# Patient Record
Sex: MALE | Race: WHITE | NOT HISPANIC OR LATINO | Employment: FULL TIME | URBAN - METROPOLITAN AREA
[De-identification: names, ages, dates, MRNs, and addresses within clinical notes are randomized per-mention and may not be internally consistent; named-entity substitution may affect disease eponyms.]

---

## 2017-01-31 DIAGNOSIS — G47.33 OBSTRUCTIVE SLEEP APNEA: Primary | ICD-10-CM

## 2017-07-17 ENCOUNTER — RADIANT APPOINTMENT (OUTPATIENT)
Dept: GENERAL RADIOLOGY | Facility: CLINIC | Age: 42
End: 2017-07-17
Attending: FAMILY MEDICINE
Payer: COMMERCIAL

## 2017-07-17 ENCOUNTER — OFFICE VISIT (OUTPATIENT)
Dept: FAMILY MEDICINE | Facility: CLINIC | Age: 42
End: 2017-07-17
Payer: OTHER MISCELLANEOUS

## 2017-07-17 VITALS
BODY MASS INDEX: 44.1 KG/M2 | TEMPERATURE: 99.3 F | WEIGHT: 315 LBS | DIASTOLIC BLOOD PRESSURE: 88 MMHG | HEART RATE: 74 BPM | OXYGEN SATURATION: 95 % | SYSTOLIC BLOOD PRESSURE: 124 MMHG | HEIGHT: 71 IN

## 2017-07-17 DIAGNOSIS — M79.672 PAIN OF LEFT HEEL: ICD-10-CM

## 2017-07-17 DIAGNOSIS — M79.672 PAIN OF LEFT HEEL: Primary | ICD-10-CM

## 2017-07-17 PROCEDURE — 73650 X-RAY EXAM OF HEEL: CPT | Mod: LT

## 2017-07-17 PROCEDURE — 99213 OFFICE O/P EST LOW 20 MIN: CPT | Performed by: FAMILY MEDICINE

## 2017-07-17 NOTE — MR AVS SNAPSHOT
"              After Visit Summary   7/17/2017    Rm Ordoñez    MRN: [id number]           Patient Information     Date Of Birth          1975        Visit Information        Provider Department      7/17/2017 2:40 PM Hayden Orellana MD [facility]        Today's Diagnoses     Pain of left heel    -  1       Follow-ups after your visit        Who to contact     If you have questions or need follow up information about today's clinic visit or your schedule please contact [facility] directly at [phone].  Normal or non-critical lab and imaging results will be communicated to you by Plinkhart, letter or phone within 4 business days after the clinic has received the results. If you do not hear from us within 7 days, please contact the clinic through Euro Card Spaint or phone. If you have a critical or abnormal lab result, we will notify you by phone as soon as possible.  Submit refill requests through Maozhao or call your pharmacy and they will forward the refill request to us. Please allow 3 business days for your refill to be completed.          Additional Information About Your Visit        MyChart Information     Maozhao gives you secure access to your electronic health record. If you see a primary care provider, you can also send messages to your care team and make appointments. If you have questions, please call your primary care clinic.  If you do not have a primary care provider, please call [phone] and they will assist you.        Care EveryWhere ID     This is your Care EveryWhere ID. This could be used by other organizations to access your [place] medical records  [id number]        Your Vitals Were     Pulse Temperature Height Pulse Oximetry BMI (Body Mass Index)       74 99.3  F (37.4  C) (Oral) 5' 11\" (1.803 m) 95% 44.04 kg/m2        Blood Pressure from Last 3 Encounters:   07/17/17 124/88   01/08/16 (!) 142/92   10/29/15 (!) 138/96    Weight from Last 3 " Encounters:   07/17/17 (!) 315 lb 12 oz (143.2 kg)   01/08/16 298 lb (135.2 kg)   10/29/15 295 lb (133.8 kg)               Primary Care Provider Office Phone # Fax #    Hayden Nolvia Orellana [phone]       [place] [address] [place] [id number]        Equal Access to Services     TIRSO MICHELLE : Hadii aad ku hadasho Soomaali, waaxda luqadaha, qaybta kaalmada adeegyada, waxay idiin hayaan adeeg khjanellesh lazara navarro. So [facility] [phone].    ATENCIÓN: Si habla español, tiene a todd disposición servicios gratuitos de asistencia lingüística. Llame al [phone].    We comply with applicable federal civil rights laws and [place] laws. We do not discriminate on the basis of race, color, national origin, age, disability sex, sexual orientation or gender identity.            Thank you!     Thank you for choosing [facility]  for your care. Our goal is always to provide you with excellent care. Hearing back from our patients is one way we can continue to improve our services. Please take a few minutes to complete the written survey that you may receive in the mail after your visit with us. Thank you!             Your Updated Medication List - Protect others around you: Learn how to safely use, store and throw away your medicines at www.disposemymeds.org.          This list is accurate as of: 7/17/17 11:59 PM.  Always use your most recent med list.                   Brand Name Dispense Instructions for use Diagnosis    order for DME      Equipment being ordered: CPAP Patient Rm Ordoñez was set up at [place] on November 12, 2015. Patient received a Resmed AirCurve 10 Bilevel. Pressures were set at [id number]-10.   Patient?s ramp is 7 cm H2O for Auto and FLEX/EPR is EPR.  Patient received a Mendez & Paykel Mask name: SIMPLUS  Full Face mask Size Medium, heated tubing and heated humidifier.  Patient is enrolled in the [facility] Program and does need to meet  compliance. Patient has a follow up on TBD with Dr. Mcclendon.  Cecile Fernando

## 2017-07-17 NOTE — NURSING NOTE
"Chief Complaint   Patient presents with     Work Comp     Musculoskeletal Problem       Initial /88 (Cuff Size: Adult Large)  Pulse 74  Temp 99.3  F (37.4  C) (Oral)  Ht 5' 11\" (1.803 m)  Wt (!) 315 lb 12 oz (143.2 kg)  SpO2 95%  BMI 44.04 kg/m2 Estimated body mass index is 44.04 kg/(m^2) as calculated from the following:    Height as of this encounter: 5' 11\" (1.803 m).    Weight as of this encounter: 315 lb 12 oz (143.2 kg).  Medication Reconciliation: complete     Eli Roque, LORELEI      "

## 2017-10-17 ENCOUNTER — OFFICE VISIT (OUTPATIENT)
Dept: FAMILY MEDICINE | Facility: CLINIC | Age: 42
End: 2017-10-17
Payer: COMMERCIAL

## 2017-10-17 VITALS
TEMPERATURE: 98.4 F | WEIGHT: 315 LBS | OXYGEN SATURATION: 95 % | RESPIRATION RATE: 22 BRPM | BODY MASS INDEX: 44.91 KG/M2 | HEART RATE: 68 BPM | SYSTOLIC BLOOD PRESSURE: 134 MMHG | DIASTOLIC BLOOD PRESSURE: 84 MMHG

## 2017-10-17 DIAGNOSIS — R35.0 URINARY FREQUENCY: Primary | ICD-10-CM

## 2017-10-17 LAB
ALBUMIN UR-MCNC: NEGATIVE MG/DL
APPEARANCE UR: CLEAR
BASOPHILS # BLD AUTO: 0 10E9/L (ref 0–0.2)
BASOPHILS NFR BLD AUTO: 0.2 %
BILIRUB UR QL STRIP: NEGATIVE
COLOR UR AUTO: YELLOW
DIFFERENTIAL METHOD BLD: ABNORMAL
EOSINOPHIL # BLD AUTO: 0.2 10E9/L (ref 0–0.7)
EOSINOPHIL NFR BLD AUTO: 2 %
ERYTHROCYTE [DISTWIDTH] IN BLOOD BY AUTOMATED COUNT: 13.9 % (ref 10–15)
GLUCOSE UR STRIP-MCNC: NEGATIVE MG/DL
HCT VFR BLD AUTO: 46.7 % (ref 40–53)
HGB BLD-MCNC: 15.5 G/DL (ref 13.3–17.7)
HGB UR QL STRIP: NEGATIVE
KETONES UR STRIP-MCNC: NEGATIVE MG/DL
LEUKOCYTE ESTERASE UR QL STRIP: NEGATIVE
LYMPHOCYTES # BLD AUTO: 2.1 10E9/L (ref 0.8–5.3)
LYMPHOCYTES NFR BLD AUTO: 18.5 %
MCH RBC QN AUTO: 29.6 PG (ref 26.5–33)
MCHC RBC AUTO-ENTMCNC: 33.2 G/DL (ref 31.5–36.5)
MCV RBC AUTO: 89 FL (ref 78–100)
MONOCYTES # BLD AUTO: 1 10E9/L (ref 0–1.3)
MONOCYTES NFR BLD AUTO: 9.1 %
NEUTROPHILS # BLD AUTO: 7.9 10E9/L (ref 1.6–8.3)
NEUTROPHILS NFR BLD AUTO: 70.2 %
NITRATE UR QL: NEGATIVE
PH UR STRIP: 7.5 PH (ref 5–7)
PLATELET # BLD AUTO: 323 10E9/L (ref 150–450)
PSA SERPL-ACNC: 0.4 UG/L (ref 0–4)
RBC # BLD AUTO: 5.24 10E12/L (ref 4.4–5.9)
SOURCE: ABNORMAL
SP GR UR STRIP: 1.01 (ref 1–1.03)
UROBILINOGEN UR STRIP-ACNC: 0.2 EU/DL (ref 0.2–1)
WBC # BLD AUTO: 11.2 10E9/L (ref 4–11)

## 2017-10-17 PROCEDURE — 81003 URINALYSIS AUTO W/O SCOPE: CPT | Performed by: PHYSICIAN ASSISTANT

## 2017-10-17 PROCEDURE — 36415 COLL VENOUS BLD VENIPUNCTURE: CPT | Performed by: PHYSICIAN ASSISTANT

## 2017-10-17 PROCEDURE — 85025 COMPLETE CBC W/AUTO DIFF WBC: CPT | Performed by: PHYSICIAN ASSISTANT

## 2017-10-17 PROCEDURE — 99213 OFFICE O/P EST LOW 20 MIN: CPT | Performed by: PHYSICIAN ASSISTANT

## 2017-10-17 PROCEDURE — G0103 PSA SCREENING: HCPCS | Performed by: PHYSICIAN ASSISTANT

## 2017-10-17 NOTE — NURSING NOTE
Vitals:    10/17/17 0920 10/17/17 0952   BP: 141/90 134/84   BP Location: Left arm Left arm   Patient Position: Sitting Sitting   Cuff Size: Adult Large Adult Large   Pulse: 73 68   Resp: 22    Temp: 98.4  F (36.9  C)    TempSrc: Oral    SpO2: 95%    Weight: (!) 322 lb (146.1 kg)          Román Peterson CMA

## 2017-10-17 NOTE — PATIENT INSTRUCTIONS
Referral for urology updated.  PSA and Complete Blood Count done.  Return to clinic with any worsening or changes in symptoms and follow up with PCP for routine care.

## 2017-10-17 NOTE — MR AVS SNAPSHOT
After Visit Summary   10/17/2017    Rm Ordoñez    MRN: [id number]           Patient Information     Date Of Birth          1975        Visit Information        Provider Department      10/17/2017 9:20 AM Magalie Camacho PA-C [facility]        Today's Diagnoses     Urinary frequency    -  1       Follow-ups after your visit        Additional Services     UROLOGY ADULT REFERRAL       Your provider has referred you to: FMG: [facility] [phone]   [email or web address]  FHN: [facility] [place] [phone]   [email or web address]  [place] [phone]   [email or web address]    Please be aware that coverage of these services is subject to the terms and limitations of your health insurance plan.  Call member services at your health plan with any benefit or coverage questions.      Please bring the following with you to your appointment:    (1) Any X-Rays, CTs or MRIs which have been performed.  Contact the facility where they were done to arrange for  prior to your scheduled appointment.    (2) List of current medications  (3) This referral request   (4) Any documents/labs given to you for this referral                  Who to contact     If you have questions or need follow up information about today's clinic visit or your schedule please contact [facility] directly at [phone].  Normal or non-critical lab and imaging results will be communicated to you by MyChart, letter or phone within 4 business days after the clinic has received the results. If you do not hear from us within 7 days, please contact the clinic through MyChart or phone. If you have a critical or abnormal lab result, we will notify you by phone as soon as possible.  Submit refill requests through Berry Kitchen or call your pharmacy and they will forward the refill request to us. Please allow 3 business days for your refill  to be completed.          Additional Information About Your Visit        Kinesio Capturehart Information     [employer] gives you secure access to your electronic health record. If you see a primary care provider, you can also send messages to your care team and make appointments. If you have questions, please call your primary care clinic.  If you do not have a primary care provider, please call [phone] and they will assist you.        Care EveryWhere ID     This is your Care EveryWhere ID. This could be used by other organizations to access your [place] medical records  [id number]        Your Vitals Were     Pulse Temperature Respirations Pulse Oximetry BMI (Body Mass Index)       73 98.4  F (36.9  C) (Oral) 22 95% 44.91 kg/m2        Blood Pressure from Last 3 Encounters:   10/17/17 141/90   07/17/17 124/88   01/08/16 (!) 142/92    Weight from Last 3 Encounters:   10/17/17 (!) 322 lb (146.1 kg)   07/17/17 (!) 315 lb 12 oz (143.2 kg)   01/08/16 298 lb (135.2 kg)              We Performed the Following     CBC with platelets differential     Prostate spec antigen screen     UA reflex to Microscopic and Culture     UROLOGY ADULT REFERRAL        Primary Care Provider Office Phone # Fax #    Hayden Nolvia Orellana [phone] [address] [place] [address]        Equal Access to Services     TIRSO MICHELLE : Hadii lin ku hadasho Soomaali, waaxda luqadaha, qaybta kaalmada shirin, yonathan navarro. So [facility] [phone].    ATENCIÓN: Si habla español, tiene a todd disposición servicios gratuitos de asistencia lingüística. Llmarco al [phone].    We comply with applicable federal civil rights laws and [place] laws. We do not discriminate on the basis of race, color, national origin, age, disability, sex, sexual orientation, or gender identity.            Thank you!     Thank you for choosing [facility]  for your care. Our goal is always to provide you  with excellent care. Hearing back from our patients is one way we can continue to improve our services. Please take a few minutes to complete the written survey that you may receive in the mail after your visit with us. Thank you!             Your Updated Medication List - Protect others around you: Learn how to safely use, store and throw away your medicines at www.disposemymeds.org.          This list is accurate as of: 10/17/17  9:36 AM.  Always use your most recent med list.                   Brand Name Dispense Instructions for use Diagnosis    order for DME      Equipment being ordered: CPAP Patient Rm Ordoñez was set up at [place] on November 12, 2015. Patient received a Resmed AirCurve 10 Bilevel. Pressures were set at [id number]-10.   Patient?s ramp is 7 cm H2O for Auto and FLEX/EPR is EPR.  Patient received a Mendez & PaySwarm Mask name: SIMPLUS  Full Face mask Size Medium, heated tubing and heated humidifier.  Patient is enrolled in the STM Program and does need to meet compliance. Patient has a follow up on TBD with Dr. Mcclendon.  Cecile Fernando

## 2017-10-17 NOTE — NURSING NOTE
"Chief Complaint   Patient presents with     Urinary Problem       Initial /90 (BP Location: Left arm, Patient Position: Sitting, Cuff Size: Adult Large)  Pulse 73  Temp 98.4  F (36.9  C) (Oral)  Resp 22  Wt (!) 322 lb (146.1 kg)  SpO2 95%  BMI 44.91 kg/m2 Estimated body mass index is 44.91 kg/(m^2) as calculated from the following:    Height as of 7/17/17: 5' 11\" (1.803 m).    Weight as of this encounter: 322 lb (146.1 kg).  Medication Reconciliation: complete       Román Peterson CMA  "

## 2017-10-17 NOTE — PROGRESS NOTES
SUBJECTIVE:   Rm Ordoñez is a 42 year old male who presents to clinic today for the following health issues:    Genitourinary symptoms      Duration: on going since 7 years old     Description:  Dysuria, Urgency, and frequency    Intensity:  mild    Accompanying signs and symptoms (fever/discharge/nausea/vomiting/back or abdominal pain):  None    History (frequent UTI's/kidney stones/prostate problems): UTI  Sexually active: no     Precipitating or alleviating factors: None    Therapies tried and outcome: cranberry juice and increase fluid intake   Outcome: does not help       History of workup at [facility].  Previously referred to urology as well., but never went.  Symptoms every half hour more recently over the past few weeks with a little discomfort.  History of kidney stones in family.  No fever, chills, night sweats.  NO std concerns.        Problem list and histories reviewed & adjusted, as indicated.  Additional history: as documented    Patient Active Problem List   Diagnosis     CARDIOVASCULAR SCREENING; LDL GOAL LESS THAN 160     Obstructive sleep apnea     Obesity (BMI 30-39.9)     Past Surgical History:   Procedure Laterality Date     ENT SURGERY      tonsilectomy, septum repair, sleep apnea       Social History   Substance Use Topics     Smoking status: Never Smoker     Smokeless tobacco: Never Used     Alcohol use No     History reviewed. No pertinent family history.      Current Outpatient Prescriptions   Medication Sig Dispense Refill     order for DME Equipment being ordered: CPAP  Patient Rm Ordoñez was set up at [place] on November 12, 2015. Patient received a Resmed AirCurve 10 Bilevel. Pressures were set at [id number]-10.   Patient s ramp is 7 cm H2O for Auto and FLEX/EPR is EPR.  Patient received a Mendez & Additech Mask name: SIMPLUS  Full Face mask Size Medium, heated tubing and heated humidifier.  Patient is enrolled in the STM Program and does need to meet  compliance. Patient has a follow up on TBD with Dr. Mcclendon.   Cecile Fernando       No Known Allergies      Reviewed and updated as needed this visit by clinical staffTobacco  Allergies  Meds  Problems  Med Hx  Surg Hx  Fam Hx  Soc Hx        Reviewed and updated as needed this visit by Provider  Allergies  Meds  Problems         ROS:  Constitutional, HEENT, cardiovascular, pulmonary, gi and gu systems are negative, except as otherwise noted.      OBJECTIVE:   /90 (BP Location: Left arm, Patient Position: Sitting, Cuff Size: Adult Large)  Pulse 73  Temp 98.4  F (36.9  C) (Oral)  Resp 22  Wt (!) 322 lb (146.1 kg)  SpO2 95%  BMI 44.91 kg/m2  Body mass index is 44.91 kg/(m^2).  GENERAL: healthy, alert and no distress  RESP: lungs clear to auscultation - no rales, rhonchi or wheezes  CV: regular rate and rhythm, normal S1 S2, no S3 or S4, no murmur, click or rub, no peripheral edema and peripheral pulses strong  ABDOMEN: soft, nontender, no hepatosplenomegaly, no masses and bowel sounds normal  BACK: no CVA tenderness  PSYCH: mentation appears normal, affect normal/bright    Diagnostic Test Results:  Results for orders placed or performed in visit on 10/17/17   UA reflex to Microscopic and Culture   Result Value Ref Range    Color Urine Yellow     Appearance Urine Clear     Glucose Urine Negative NEG^Negative mg/dL    Bilirubin Urine Negative NEG^Negative    Ketones Urine Negative NEG^Negative mg/dL    Specific Gravity Urine 1.015 1.003 - 1.035    Blood Urine Negative NEG^Negative    pH Urine 7.5 (H) 5.0 - 7.0 pH    Protein Albumin Urine Negative NEG^Negative mg/dL    Urobilinogen Urine 0.2 0.2 - 1.0 EU/dL    Nitrite Urine Negative NEG^Negative    Leukocyte Esterase Urine Negative NEG^Negative    Source Midstream Urine         ASSESSMENT/PLAN:       ICD-10-CM    1. Urinary frequency R35.0 UA reflex to Microscopic and Culture     Prostate spec antigen screen     CBC with platelets  differential     UROLOGY ADULT REFERRAL       Patient Instructions   Referral for urology updated.  PSA and Complete Blood Count done.  Return to clinic with any worsening or changes in symptoms and follow up with PCP for routine care.       Magalie Camacho PA-C  [facility]

## 2017-10-19 NOTE — PROGRESS NOTES
"Maggie Abdalla  Your attached labs are overall within normal limits. Please follow up with urology as previously discussed.  Please contact the office with any questions or concerns.    Magalie Ponce \"Reilly\" MIRIAM Camacho  "

## 2018-04-13 DIAGNOSIS — G47.33 OBSTRUCTIVE SLEEP APNEA: Primary | ICD-10-CM

## 2018-04-13 NOTE — PROGRESS NOTES
Orders sent to [facility] for cpap supplies    Rachel Adrian [facility] ~Mora

## 2018-06-06 ENCOUNTER — OFFICE VISIT (OUTPATIENT)
Dept: FAMILY MEDICINE | Facility: CLINIC | Age: 43
End: 2018-06-06
Payer: COMMERCIAL

## 2018-06-06 DIAGNOSIS — M54.6 ACUTE MIDLINE THORACIC BACK PAIN: Primary | ICD-10-CM

## 2018-06-06 PROCEDURE — 99213 OFFICE O/P EST LOW 20 MIN: CPT | Performed by: NURSE PRACTITIONER

## 2018-06-06 NOTE — MR AVS SNAPSHOT
After Visit Summary   6/6/2018    Rm Ordoñez    MRN: [id number]           Patient Information     Date Of Birth          1975        Visit Information        Provider Department      6/6/2018 10:00 AM Stefany Freed APRN [facility]        Today's Diagnoses     Acute midline thoracic back pain    -  1      Care Instructions    1.  For acute back pain suspect muscle strain, expect improvement in the next week.  Start ibuprofen 3-4 tabs three times a day.  Heat or ice.  Let me know if you want a muscle relaxer.  See physical therapy if not improving in the next week or now.  let me know if not improving within 1-2 weeks of physical therapy.  Weight loss will help prevent future episodes.            Follow-ups after your visit        Additional Services     [facility] PT, HAND, AND CHIROPRACTIC REFERRAL       **This order will print in the [facility] Scheduling Office**    Physical Therapy, Hand Therapy and Chiropractic Care are available through:    *[facility]  *[facility]  *[facility]    Call one number to schedule at any of the above locations: [phone].    Your provider has referred you to: [facility] - PT and/or Chiropractic Care determined by clinical presentation at [facility] or [facility] Initial Visit    Indication/Reason for Referral: Thoracic Pain  Onset of Illness: 1 week  Therapy Orders: Evaluate and Treat  Special Programs: None  Special Request: None    Elizabeth Johnson      Additional Comments for the Therapist or Chiropractor:     Please be aware that coverage of these services is subject to the terms and limitations of your health insurance plan.  Call member services at your health plan with any benefit or coverage questions.      Please bring the following to your appointment:    *Your personal calendar for scheduling future appointments  *Comfortable clothing                  Who to contact     If  you have questions or need follow up information about today's clinic visit or your schedule please [facility] directly at [phone].  Normal or non-critical lab and imaging results will be communicated to you by MyChart, letter or phone within 4 business days after the clinic has received the results. If you do not hear from us within 7 days, please contact the clinic through Innovative Student Loan Solutionshart or phone. If you have a critical or abnormal lab result, we will notify you by phone as soon as possible.  Submit refill requests through Konbini or call your pharmacy and they will forward the refill request to us. Please allow 3 business days for your refill to be completed.          Additional Information About Your Visit        In[employer] Solutions[employer]SnagFilms Information     Konbini gives you secure access to your electronic health record. If you see a primary care provider, you can also send messages to your care team and make appointments. If you have questions, please call your primary care clinic.  If you do not have a primary care provider, please call [phone] and they will assist you.        Care EveryWhere ID     This is your Care EveryWhere ID. This could be used by other organizations to access your [place] medical records  [id number]         Blood Pressure from Last 3 Encounters:   10/17/17 134/84   07/17/17 124/88   01/08/16 (!) 142/92    Weight from Last 3 Encounters:   10/17/17 (!) 322 lb (146.1 kg)   07/17/17 (!) 315 lb 12 oz (143.2 kg)   01/08/16 298 lb (135.2 kg)              We Performed the Following     [facility] PT, HAND, AND CHIROPRACTIC REFERRAL        Primary Care Provider Office Phone # Fax #    Haydenjessica Orellana [phone] [address] [place] [address]        Equal Access to Services     TIRSO MICHELLE : Andry Baird, nellie peacock, yonathan burris. So [facility] [phone].    ATENCIÓN: Rachell lee  español, tiene a todd disposición servicios gratuitos de asistencia lingüística. Sam wing [phone].    We comply with applicable federal civil rights laws and [place] laws. We do not discriminate on the basis of race, color, national origin, age, disability, sex, sexual orientation, or gender identity.            Thank you!     Thank you for choosing [facility]  for your care. Our goal is always to provide you with excellent care. Hearing back from our patients is one way we can continue to improve our services. Please take a few minutes to complete the written survey that you may receive in the mail after your visit with us. Thank you!             Your Updated Medication List - Protect others around you: Learn how to safely use, store and throw away your medicines at www.disposemymeds.org.          This list is accurate as of 6/6/18 10:19 AM.  Always use your most recent med list.                   Brand Name Dispense Instructions for use Diagnosis    order for DME      Equipment being ordered: CPAP Patient Rm Ordoñez was set up at [place] on November 12, 2015. Patient received a Resmed AirCurve 10 Bilevel. Pressures were set at [id number]-10.   Patient?s ramp is 7 cm H2O for Auto and FLEX/EPR is EPR.  Patient received a Mendez & Paykel Mask name: SIMPLUS  Full Face mask Size Medium, heated tubing and heated humidifier.  Patient is enrolled in the STM Program and does need to meet compliance. Patient has a follow up on TBD with Dr. Mcclendon.  Cecile Fernando

## 2018-06-06 NOTE — PATIENT INSTRUCTIONS
1.  For acute back pain suspect muscle strain, expect improvement in the next week.  Start ibuprofen 3-4 tabs three times a day.  Heat or ice.  Let me know if you want a muscle relaxer.  See physical therapy if not improving in the next week or now.  let me know if not improving within 1-2 weeks of physical therapy.  Weight loss will help prevent future episodes.

## 2018-06-06 NOTE — PROGRESS NOTES
SUBJECTIVE:   Rm Ordoñez is a 42 year old male who presents to clinic today for the following health issues:      Sudden onset of back pain 1 week ago, woke up with significant pain.  Pain is to midline thoracic back.  Pain is constant.  Does improve with ibuprofen.  Pain is worse with movement.  Hard to stand up after leaning over kitchen table yesterday.  No fevers.  No injury or overuse.  No hx of chronic back problems.  No dysuria, urinary frequency, or hematuria.  No coughing or shortness of breath.  Pain does not radiate down     Family hx of kidney stones (mom).  No personal history.    Patient Active Problem List   Diagnosis     CARDIOVASCULAR SCREENING; LDL GOAL LESS THAN 160     Obstructive sleep apnea     Obesity (BMI 30-39.9)     Past Surgical History:   Procedure Laterality Date     ENT SURGERY      tonsilectomy, septum repair, sleep apnea       Social History   Substance Use Topics     Smoking status: Never Smoker     Smokeless tobacco: Never Used     Alcohol use No     No family history on file.      Current Outpatient Prescriptions   Medication Sig Dispense Refill     order for DME Equipment being ordered: CPAP  Patient Rm Ordoñez was set up at [facility] on November 12, 2015. Patient received a Resmed AirCurve 10 Bilevel. Pressures were set at [id number].   Patient s ramp is 7 cm H2O for Auto and FLEX/EPR is EPR.  Patient received a [employer] Mask name: SIMPLUS  Full Face mask Size Medium, heated tubing and heated humidifier.  Patient is enrolled in the [facility] Program and does need to meet compliance. Patient has a follow up on TBD with Dr. Mcclendon.   Cecile Fernando         ROS:  Resp, MSK, Neuro,  as above, otherwise negative       OBJECTIVE:                                                    There were no vitals taken for this visit.   GENERAL APPEARANCE: healthy, alert and no distress  EYES: Eyes grossly normal to inspection and conjunctivae and sclerae  normal  RESP: respirations nonlabored   ORTHO: Lumber/Thoracic Spine Exam: Tender:  thoracic spinous processes  Non-tender:  left parathoracic muscles, right parathoracic muscles, lumbar spinous processes  Range of Motion:  Full active ROM, pain with lateral bend bilaterally and rotation.  Strength:  able to heel walk, able to toe walk  Neuro:  Bilateral lower extremities strength +5/5    PSYCH: mentation appears normal and affect normal/bright        ASSESSMENT/PLAN:                                                    (M54.6) Acute midline thoracic back pain  (primary encounter diagnosis)  Comment: suspect acute strain.  Given constant symptoms worse with movement MSK etiology seems likely.  No urinary symptoms and presentation not consistent with renal colic.    Plan: [facility], AND CHIROPRACTIC REFERRAL         Discussed good prognosis with conservative management.  NSAIDS, gentle exercise, heat/ice.  Recommended not driving or operating machinery on flexeril.  Discussed avoiding bed rest.  If no improvement in 1 week, pt will see PT.  If no improvement in 4-6 weeks will refer to specialist.          See Patient Instructions    Stefany Freed, CNP  [facility]    Patient Instructions   1.  For acute back pain suspect muscle strain, expect improvement in the next week.  Start ibuprofen 3-4 tabs three times a day.  Heat or ice.  Let me know if you want a muscle relaxer.  See physical therapy if not improving in the next week or now.  let me know if not improving within 1-2 weeks of physical therapy.  Weight loss will help prevent future episodes.

## 2018-06-07 ENCOUNTER — THERAPY VISIT (OUTPATIENT)
Dept: PHYSICAL THERAPY | Facility: CLINIC | Age: 43
End: 2018-06-07
Attending: NURSE PRACTITIONER
Payer: COMMERCIAL

## 2018-06-07 DIAGNOSIS — M54.6 PAIN IN THORACIC SPINE: Primary | ICD-10-CM

## 2018-06-07 PROCEDURE — 97161 PT EVAL LOW COMPLEX 20 MIN: CPT | Mod: GP | Performed by: PHYSICAL THERAPIST

## 2018-06-07 PROCEDURE — 97110 THERAPEUTIC EXERCISES: CPT | Mod: GP | Performed by: PHYSICAL THERAPIST

## 2018-06-07 NOTE — PROGRESS NOTES
[facility] Initial Evaluation  Subjective:  Patient is a 42 year old male presenting with rehab back hpi. The history is provided by the patient. No  was used.   Rm Ordoñez is a 42 year old male with a thoracic condition.  Condition occurred with:  Insidious onset.  Condition occurred: in the community.  This is a new condition  Onset of mid back pain about 1 week ago for no known reason. .    Patient reports pain:  Central lumbar spine and central thoracic spine.    Pain is described as aching and sharp and is constant and reported as 2/10 and 9/10.   Pain is the same all the time.  Exacerbated by: nothing seems to change the pain. and relieved by NSAID's.  Since onset symptoms are gradually improving.        General health as reported by patient is good.  Pertinent medical history includes:  Overweight and sleep disorder/apnea.  Medical allergies: no.  Other surgeries include:  No.  Medication history: IBP.  Current occupation is .  Patient is currently not working due to present treatment problem.  Primary job tasks include:  Repetitive tasks.    Barriers include:  None as reported by the patient.    Red flags:  None as reported by the patient.                        Objective:  System         Lumbar/SI Evaluation  ROM:    AROM Lumbar:   Flexion:          To lower shin - pain  Ext:                    WNL - pain   Side Bend:        Left:  To upper knee- pain    Right:  To upper knee + pain in thoracic region  Rotation:           Left:     Right:   Side Glide:        Left:     Right:       AROM Thoracic:  Flex:               WNL -pain  Ext:                WNL - pain  Rotation:        Left:  WNL - pain    Right:  WNL + pain     Strength: Normal UE strength without pain            Lumbar Palpation:    Tenderness present at Left:    Erector Spinae  Tenderness not present at Left:    Vertebral  Tenderness present at Right: Erector Spinae  Tenderness not  present at Right:  Vertebral      Spinal Segmental Conclusions: Poor mid thoracic mobility noted                                                           General Evaluation:                        Posture:    Standing:   Rounded shoulders, forward head and thoracic kyphosis increased  Sitting:  Thoracic kyphosis increased, forward head and rounded shoulders                                           ROS    Assessment/Plan:    Patient is a 42 year old male with thoracic complaints.    Patient has the following significant findings with corresponding treatment plan.                Diagnosis 1:  Mid back pain likely due to poor posture and mid back weakness  Pain -  hot/cold therapy, manual therapy, self management, education and home program  Decreased ROM/flexibility - manual therapy, therapeutic exercise, therapeutic activity and home program  Decreased strength - therapeutic exercise, therapeutic activities and home program  Impaired muscle performance - neuro re-education and home program  Decreased function - therapeutic activities and home program  Impaired posture - neuro re-education, therapeutic activities and home program    Therapy Evaluation Codes:   1) History comprised of:   Personal factors that impact the plan of care:      None.    Comorbidity factors that impact the plan of care are:      Overweight and Sleep disorder/apnea.     Medications impacting care: NSAIDS.  2) Examination of Body Systems comprised of:   Body structures and functions that impact the plan of care:      Thoracic Spine.   Activity limitations that impact the plan of care are:      Sitting and Urgency.  3) Clinical presentation characteristics are:   Stable/Uncomplicated.  4) Decision-Making    Low complexity using standardized patient assessment instrument and/or measureable assessment of functional outcome.  Cumulative Therapy Evaluation is: Low complexity.    Previous and current functional limitations:  (See Goal Flow Sheet  for this information)    Short term and Long term goals: (See Goal Flow Sheet for this information)     Communication ability:  Patient appears to be able to clearly communicate and understand verbal and written communication and follow directions correctly.  Treatment Explanation - The following has been discussed with the patient:   RX ordered/plan of care  Anticipated outcomes  Possible risks and side effects  This patient would benefit from PT intervention to resume normal activities.   Rehab potential is good.    Frequency:  1 X week, once daily  Duration:  for 3-4 weeks  Discharge Plan:  Achieve all LTG.  Independent in home treatment program.  Reach maximal therapeutic benefit.    Please refer to the daily flowsheet for treatment today, total treatment time and time spent performing 1:1 timed codes.

## 2018-06-10 ENCOUNTER — MYC MEDICAL ADVICE (OUTPATIENT)
Dept: FAMILY MEDICINE | Facility: CLINIC | Age: 43
End: 2018-06-10

## 2018-06-11 NOTE — TELEPHONE ENCOUNTER
Form completed and placed in MA basket.  Please fax as indicated and abstract a copy into epic.    Stefany Freed CNP

## 2019-05-28 PROBLEM — M54.6 PAIN IN THORACIC SPINE: Status: RESOLVED | Noted: 2018-06-07 | Resolved: 2019-05-28

## 2019-11-20 DIAGNOSIS — G47.33 OBSTRUCTIVE SLEEP APNEA (ADULT) (PEDIATRIC): Primary | ICD-10-CM

## 2020-03-01 ENCOUNTER — HEALTH MAINTENANCE LETTER (OUTPATIENT)
Age: 45
End: 2020-03-01

## 2020-09-23 ENCOUNTER — OFFICE VISIT (OUTPATIENT)
Dept: FAMILY MEDICINE | Facility: CLINIC | Age: 45
End: 2020-09-23
Payer: COMMERCIAL

## 2020-09-23 VITALS
OXYGEN SATURATION: 97 % | SYSTOLIC BLOOD PRESSURE: 128 MMHG | WEIGHT: 315 LBS | HEART RATE: 66 BPM | DIASTOLIC BLOOD PRESSURE: 86 MMHG | HEIGHT: 71 IN | TEMPERATURE: 98.7 F | BODY MASS INDEX: 44.1 KG/M2 | RESPIRATION RATE: 20 BRPM

## 2020-09-23 DIAGNOSIS — Z13.1 SCREENING FOR DIABETES MELLITUS: ICD-10-CM

## 2020-09-23 DIAGNOSIS — E66.01 MORBID OBESITY (H): ICD-10-CM

## 2020-09-23 DIAGNOSIS — R30.0 DYSURIA: Primary | ICD-10-CM

## 2020-09-23 LAB
ALBUMIN UR-MCNC: NEGATIVE MG/DL
APPEARANCE UR: CLEAR
BILIRUB UR QL STRIP: NEGATIVE
COLOR UR AUTO: YELLOW
GLUCOSE UR STRIP-MCNC: NEGATIVE MG/DL
HBA1C MFR BLD: 5.2 % (ref 0–5.6)
HGB UR QL STRIP: NEGATIVE
KETONES UR STRIP-MCNC: NEGATIVE MG/DL
LEUKOCYTE ESTERASE UR QL STRIP: NEGATIVE
NITRATE UR QL: NEGATIVE
PH UR STRIP: 7 PH (ref 5–7)
RBC #/AREA URNS AUTO: NORMAL /HPF
SOURCE: NORMAL
SP GR UR STRIP: 1.02 (ref 1–1.03)
UROBILINOGEN UR STRIP-ACNC: 1 EU/DL (ref 0.2–1)
WBC #/AREA URNS AUTO: NORMAL /HPF

## 2020-09-23 PROCEDURE — 99213 OFFICE O/P EST LOW 20 MIN: CPT | Performed by: FAMILY MEDICINE

## 2020-09-23 PROCEDURE — 81001 URINALYSIS AUTO W/SCOPE: CPT | Performed by: FAMILY MEDICINE

## 2020-09-23 PROCEDURE — 36415 COLL VENOUS BLD VENIPUNCTURE: CPT | Performed by: FAMILY MEDICINE

## 2020-09-23 PROCEDURE — 83036 HEMOGLOBIN GLYCOSYLATED A1C: CPT | Performed by: FAMILY MEDICINE

## 2020-09-23 ASSESSMENT — MIFFLIN-ST. JEOR: SCORE: 2400.39

## 2020-09-23 NOTE — RESULT ENCOUNTER NOTE
Your urine test also does not show any infection which is reassuring.  As we discussed start taking stool softner and see if constipation is contributing to your symptoms.   I would suggest seeing a urologist if your symptoms persist. Just call clinic to get a referral. No need to schedule another appointment just to get a referral.  Call us with questions.    Hayden Orellana MD  [facility]

## 2020-09-23 NOTE — PROGRESS NOTES
Ian Ordoñez is a 45 year old male who presents to clinic today for the following health issues:    HPI     Genitourinary - Male  Onset/Duration: 1 month   Description:   Dysuria (painful urination): YES}  Hematuria (blood in urine): no  Frequency: YES- once an hour  Waking at night to urinate: YES  Hesitancy (delay in urine): no  Retention (unable to empty): YES  Decrease in urinary flow: YES-Starts out strong flow then decreases   Incontinence: no  Progression of Symptoms:  same  Accompanying Signs & Symptoms:  Fever: no  Back/Flank pain: no  Urethral discharge: no  Testicle lumps/masses/pain: no  Nausea and/or vomiting: no  Abdominal pain: no  History:   History of frequent UTI s: no  History of kidney stones: no  History of hernias: no  Personal or Family history of Prostate problems: no  Sexually active: no  Precipitating or alleviating factors: None  Therapies tried and outcome: none     Going to bathroom frequently.   Smells really bad.   Some pain with passing urine too.  No trouble starting stream.  No leaking of urine.   No fever.  Some constipation present   No concerns of stds.   No family history of diabetes.      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    Review of system: for general, respiratory, CVS, GI and psychiatry negative except for noted above.     EXAM:  /86 (BP Location: Left arm, Patient Position: Sitting, Cuff Size: Adult Large)   Pulse 66   Temp  "98.7  F (37.1  C) (Oral)   Resp 20   Ht 1.803 m (5' 11\")   Wt 148.3 kg (327 lb)   SpO2 97%   BMI 45.61 kg/m    Constitutional: healthy, alert and no distress   Psychiatric: mentation appears normal and affect normal/bright  Abdomen: Abdomen soft, morbidly obese   deferred.    ASSESSMENT / PLAN:  (R30.0) Dysuria  (primary encounter diagnosis)  Comment: denies any concerns of stds. Cancelled gc/chlamydia order. He is morbidly obese.  Diabetes cannot be ruled out but his previous glucose was fine.  Okay to obtain A1c today.  See below.  -He was unable to leave a urine specimen and he is going to come back to drop off urine specimen.  I will get back to him pending the urinalysis result.  Does not have a typical symptoms of prostatitis but we will keep that on differential.   Plan: UA with Microscopic reflex to Culture,        (Z13.1) Screening for diabetes mellitus  Comment:    Plan: Hemoglobin A1c             (E66.01) Morbid obesity (H)  Comment:  Body mass index is 45.61 kg/m .  Offered him to see a weight loss clinic.  He does not think is necessary.  A1c does not show diabetes or prediabetes.  Plan: Hemoglobin A1c                The above note was dictated using voice recognition. Although reviewed after completion, some word and grammatical error may remain .          "

## 2020-12-14 ENCOUNTER — HEALTH MAINTENANCE LETTER (OUTPATIENT)
Age: 45
End: 2020-12-14

## 2021-02-03 DIAGNOSIS — G47.33 OBSTRUCTIVE SLEEP APNEA (ADULT) (PEDIATRIC): Primary | ICD-10-CM

## 2021-04-18 ENCOUNTER — HEALTH MAINTENANCE LETTER (OUTPATIENT)
Age: 46
End: 2021-04-18

## 2021-10-02 ENCOUNTER — HEALTH MAINTENANCE LETTER (OUTPATIENT)
Age: 46
End: 2021-10-02

## 2022-01-06 ENCOUNTER — APPOINTMENT (OUTPATIENT)
Dept: URGENT CARE | Facility: URGENT CARE | Age: 47
End: 2022-01-06
Payer: COMMERCIAL

## 2022-01-06 ENCOUNTER — LAB REQUISITION (OUTPATIENT)
Dept: LAB | Facility: CLINIC | Age: 47
End: 2022-01-06

## 2022-01-06 LAB — SARS-COV-2 RNA RESP QL NAA+PROBE: NEGATIVE

## 2022-01-06 PROCEDURE — U0005 INFEC AGEN DETEC AMPLI PROBE: HCPCS | Performed by: INTERNAL MEDICINE

## 2022-03-23 ENCOUNTER — LAB REQUISITION (OUTPATIENT)
Dept: LAB | Facility: CLINIC | Age: 47
End: 2022-03-23

## 2022-03-23 PROCEDURE — U0005 INFEC AGEN DETEC AMPLI PROBE: HCPCS | Performed by: INTERNAL MEDICINE

## 2022-03-24 LAB — SARS-COV-2 RNA RESP QL NAA+PROBE: NEGATIVE

## 2022-05-14 ENCOUNTER — HEALTH MAINTENANCE LETTER (OUTPATIENT)
Age: 47
End: 2022-05-14

## 2022-07-21 DIAGNOSIS — G47.33 OBSTRUCTIVE SLEEP APNEA (ADULT) (PEDIATRIC): Primary | ICD-10-CM

## 2022-09-03 ENCOUNTER — HEALTH MAINTENANCE LETTER (OUTPATIENT)
Age: 47
End: 2022-09-03

## 2022-10-09 ENCOUNTER — OFFICE VISIT (OUTPATIENT)
Dept: FAMILY MEDICINE | Facility: CLINIC | Age: 47
End: 2022-10-09
Payer: COMMERCIAL

## 2022-10-09 VITALS
OXYGEN SATURATION: 95 % | SYSTOLIC BLOOD PRESSURE: 145 MMHG | HEART RATE: 74 BPM | DIASTOLIC BLOOD PRESSURE: 84 MMHG | TEMPERATURE: 98.4 F

## 2022-10-09 DIAGNOSIS — J06.9 URI WITH COUGH AND CONGESTION: Primary | ICD-10-CM

## 2022-10-09 PROCEDURE — 99213 OFFICE O/P EST LOW 20 MIN: CPT | Performed by: FAMILY MEDICINE

## 2022-10-10 NOTE — PATIENT INSTRUCTIONS
Fluids, rest, diet as tolerated  steam, nasal saline for congestion.    Cough drops     Recheck if worse or no delta.r     Home covid tests.

## 2022-10-10 NOTE — PROGRESS NOTES
Assessment/Plan:   URI with cough and congestion  Acute respiratory illness for 4 days or so with cough and congestion. Home Covid tests negative x 3. Likely a viral illness. No sign on exam or by history of secondary bacterial infection.   I discussed red flag symptoms, return precautions to clinic/ER and follow up care with patient/parent.  Expected clinical course, symptomatic cares advised. Questions answered. Patient/parent amenable with plan.    Fluids, rest, diet as tolerated  steam, nasal saline for congestion.    Cough drops     Recheck if worse or no better     Home covid tests.     Subjective:     Rm Ordoñez is a 47 year old male who presents for evaluation of cough and congestion. Illness started Tuesday with chest and head congestion. Phlegmy cough. Sore throat and ear pain. Cough is a bit worse.   No fever or chills, no myalgia or fatigue. Breathing okay. No N/V/D.   Has been taking mucinex and ibuprofen for symptoms.   He had Covid in August - with similar symptoms though they only lasted one day. He was completely better before this illness started.   He has performed rapid antigen tests for covid at home this week that were negative.     No Known Allergies     Current Outpatient Medications   Medication     order for DME     No current facility-administered medications for this visit.     Patient Active Problem List   Diagnosis     CARDIOVASCULAR SCREENING; LDL GOAL LESS THAN 160     Obstructive sleep apnea     Obesity (BMI 30-39.9)     Morbid obesity (H)       Objective:     BP (!) 145/84 (BP Location: Right arm, Patient Position: Sitting, Cuff Size: Adult Large)   Pulse 74   Temp 98.4  F (36.9  C) (Tympanic)   SpO2 95%     Physical    General Appearance: Alert, pleasant, no distress, AVSS  Head: Normocephalic, without obvious abnormality, atraumatic  Eyes: Conjunctivae are normal.   Ears: Normal TMs and external ear canals, both ears  Nose: some congestion.  Throat: Throat is red  posteriorly.  No exudate.  No vesicular lesions  Neck: No adenopathy  Lungs: Clear to auscultation bilaterally, respirations unlabored. good air movement, no wheeze   Heart: Regular rate and rhythm  Skin: Skin color, texture, turgor normal, no rashes or lesions  Psychiatric: Patient has a normal mood and affect.           This note has been dictated in part using voice recognition software.  Any grammatical or context distortions are unintentional and inherent to the software.  Please feel free to contact me directly for clarification if needed.

## 2023-06-03 ENCOUNTER — HEALTH MAINTENANCE LETTER (OUTPATIENT)
Age: 48
End: 2023-06-03

## 2023-11-15 ENCOUNTER — HOSPITAL ENCOUNTER (EMERGENCY)
Facility: HOSPITAL | Age: 48
Discharge: HOME | End: 2023-11-15
Attending: EMERGENCY MEDICINE | Admitting: EMERGENCY MEDICINE
Payer: COMMERCIAL

## 2023-11-15 VITALS
TEMPERATURE: 98.6 F | RESPIRATION RATE: 18 BRPM | HEART RATE: 58 BPM | DIASTOLIC BLOOD PRESSURE: 87 MMHG | OXYGEN SATURATION: 96 % | BODY MASS INDEX: 45.61 KG/M2 | WEIGHT: 315 LBS | SYSTOLIC BLOOD PRESSURE: 180 MMHG

## 2023-11-15 DIAGNOSIS — R04.0 EPISTAXIS: ICD-10-CM

## 2023-11-15 LAB
ANION GAP SERPL CALCULATED.3IONS-SCNC: 11 MMOL/L (ref 7–15)
APTT PPP: 32 SECONDS (ref 22–38)
BUN SERPL-MCNC: 14.5 MG/DL (ref 6–20)
CALCIUM SERPL-MCNC: 9 MG/DL (ref 8.6–10)
CHLORIDE SERPL-SCNC: 103 MMOL/L (ref 98–107)
CREAT SERPL-MCNC: 0.95 MG/DL (ref 0.67–1.17)
DEPRECATED HCO3 PLAS-SCNC: 26 MMOL/L (ref 22–29)
EGFRCR SERPLBLD CKD-EPI 2021: >90 ML/MIN/1.73M2
ERYTHROCYTE [DISTWIDTH] IN BLOOD BY AUTOMATED COUNT: 13.8 % (ref 10–15)
GLUCOSE SERPL-MCNC: 146 MG/DL (ref 70–99)
HCT VFR BLD AUTO: 42.6 % (ref 40–53)
HGB BLD-MCNC: 14.4 G/DL (ref 13.3–17.7)
HOLD SPECIMEN: NORMAL
INR PPP: 1.14 (ref 0.85–1.15)
MCH RBC QN AUTO: 28.8 PG (ref 26.5–33)
MCHC RBC AUTO-ENTMCNC: 33.8 G/DL (ref 31.5–36.5)
MCV RBC AUTO: 85 FL (ref 78–100)
PLATELET # BLD AUTO: 386 10E3/UL (ref 150–450)
POTASSIUM SERPL-SCNC: 4.4 MMOL/L (ref 3.4–5.3)
RBC # BLD AUTO: 5 10E6/UL (ref 4.4–5.9)
SODIUM SERPL-SCNC: 140 MMOL/L (ref 135–145)
WBC # BLD AUTO: 14.9 10E3/UL (ref 4–11)

## 2023-11-15 PROCEDURE — 85027 COMPLETE CBC AUTOMATED: CPT | Performed by: EMERGENCY MEDICINE

## 2023-11-15 PROCEDURE — 80048 BASIC METABOLIC PNL TOTAL CA: CPT | Performed by: EMERGENCY MEDICINE

## 2023-11-15 PROCEDURE — 99284 EMERGENCY DEPT VISIT MOD MDM: CPT | Mod: 25

## 2023-11-15 PROCEDURE — 85730 THROMBOPLASTIN TIME PARTIAL: CPT | Performed by: EMERGENCY MEDICINE

## 2023-11-15 PROCEDURE — 85610 PROTHROMBIN TIME: CPT | Performed by: EMERGENCY MEDICINE

## 2023-11-15 PROCEDURE — 250N000011 HC RX IP 250 OP 636: Mod: JZ | Performed by: EMERGENCY MEDICINE

## 2023-11-15 PROCEDURE — 96374 THER/PROPH/DIAG INJ IV PUSH: CPT | Mod: 59

## 2023-11-15 PROCEDURE — 96361 HYDRATE IV INFUSION ADD-ON: CPT | Mod: 59

## 2023-11-15 PROCEDURE — 258N000003 HC RX IP 258 OP 636: Performed by: EMERGENCY MEDICINE

## 2023-11-15 PROCEDURE — 96375 TX/PRO/DX INJ NEW DRUG ADDON: CPT | Mod: 59

## 2023-11-15 PROCEDURE — 30903 CONTROL OF NOSEBLEED: CPT | Mod: 50

## 2023-11-15 PROCEDURE — 250N000013 HC RX MED GY IP 250 OP 250 PS 637: Performed by: EMERGENCY MEDICINE

## 2023-11-15 PROCEDURE — 36415 COLL VENOUS BLD VENIPUNCTURE: CPT | Performed by: EMERGENCY MEDICINE

## 2023-11-15 PROCEDURE — 250N000009 HC RX 250: Performed by: EMERGENCY MEDICINE

## 2023-11-15 RX ORDER — TRANEXAMIC ACID 100 MG/ML
INJECTION, SOLUTION INTRAVENOUS ONCE
Status: COMPLETED | OUTPATIENT
Start: 2023-11-15 | End: 2023-11-15

## 2023-11-15 RX ORDER — MORPHINE SULFATE 4 MG/ML
4 INJECTION, SOLUTION INTRAMUSCULAR; INTRAVENOUS ONCE
Status: COMPLETED | OUTPATIENT
Start: 2023-11-15 | End: 2023-11-15

## 2023-11-15 RX ORDER — OXYCODONE HYDROCHLORIDE 5 MG/1
TABLET ORAL
Qty: 12 TABLET | Refills: 0 | Status: SHIPPED | OUTPATIENT
Start: 2023-11-15 | End: 2024-01-31

## 2023-11-15 RX ORDER — FENTANYL CITRATE 50 UG/ML
50 INJECTION, SOLUTION INTRAMUSCULAR; INTRAVENOUS ONCE
Status: COMPLETED | OUTPATIENT
Start: 2023-11-15 | End: 2023-11-15

## 2023-11-15 RX ORDER — OXYMETAZOLINE HYDROCHLORIDE 0.05 G/100ML
2 SPRAY NASAL ONCE
Status: COMPLETED | OUTPATIENT
Start: 2023-11-15 | End: 2023-11-15

## 2023-11-15 RX ORDER — ONDANSETRON 2 MG/ML
4 INJECTION INTRAMUSCULAR; INTRAVENOUS ONCE
Status: COMPLETED | OUTPATIENT
Start: 2023-11-15 | End: 2023-11-15

## 2023-11-15 RX ORDER — OXYCODONE HYDROCHLORIDE 5 MG/1
5 TABLET ORAL ONCE
Status: COMPLETED | OUTPATIENT
Start: 2023-11-15 | End: 2023-11-15

## 2023-11-15 RX ORDER — ONDANSETRON 4 MG/1
4 TABLET, ORALLY DISINTEGRATING ORAL EVERY 8 HOURS PRN
Qty: 10 TABLET | Refills: 0 | Status: SHIPPED | OUTPATIENT
Start: 2023-11-15 | End: 2024-01-31

## 2023-11-15 RX ADMIN — TRANEXAMIC ACID 1000 MG: 1 INJECTION, SOLUTION INTRAVENOUS at 08:11

## 2023-11-15 RX ADMIN — MORPHINE SULFATE 4 MG: 4 INJECTION, SOLUTION INTRAMUSCULAR; INTRAVENOUS at 09:53

## 2023-11-15 RX ADMIN — SODIUM CHLORIDE 1000 ML: 9 INJECTION, SOLUTION INTRAVENOUS at 08:38

## 2023-11-15 RX ADMIN — ONDANSETRON 4 MG: 2 INJECTION INTRAMUSCULAR; INTRAVENOUS at 08:59

## 2023-11-15 RX ADMIN — OXYCODONE HYDROCHLORIDE 5 MG: 5 TABLET ORAL at 08:58

## 2023-11-15 RX ADMIN — OXYMETAZOLINE HYDROCHLORIDE 2 SPRAY: 0.05 SPRAY NASAL at 07:48

## 2023-11-15 RX ADMIN — FENTANYL CITRATE 50 MCG: 50 INJECTION, SOLUTION INTRAMUSCULAR; INTRAVENOUS at 08:59

## 2023-11-15 ASSESSMENT — ENCOUNTER SYMPTOMS
BRUISES/BLEEDS EASILY: 0
ABDOMINAL PAIN: 0
FEVER: 0

## 2023-11-15 ASSESSMENT — ACTIVITIES OF DAILY LIVING (ADL): ADLS_ACUITY_SCORE: 35

## 2023-11-15 NOTE — ED TRIAGE NOTES
Patient has had blood nose for 45 minutes, no injury, nose clamp in place, blood is flowing out continuously from nose. Patient denies any blood thinners

## 2023-11-15 NOTE — Clinical Note
Rm Ordoñez was seen and treated in our emergency department on 11/15/2023.  He may return to work on 11/17/2023.       If you have any questions or concerns, please don't hesitate to call.      Inez Gatica MD

## 2023-11-15 NOTE — DISCHARGE INSTRUCTIONS
Keep the nasal balloons in place.  Keep your appointment to see your ENT provider tomorrow at 11 AM.  They can remove these they are.    You can use the oxycodone pain medication as directed.  I also provide a prescription for nausea vomiting medication in case you need it.    You may notice some bloody snot coming from your nose, that can be expected.  If you notice still ruth bleeding or worsening bleeding please return to the emergency department for reevaluation.    Thank you for choosing [facility] Emergency Department.  It has been my pleasure caring for you today.     ~Dr. En MD

## 2023-11-15 NOTE — ED NOTES
Bed: JNFT11  Expected date:   Expected time:   Means of arrival:   Comments:  [place]  Nose bleed

## 2023-11-15 NOTE — ED PROVIDER NOTES
EMERGENCY DEPARTMENT ENCOUNTER      NAME: Rm Ordoñez  AGE: 48 year old male  YOB: 1975  MRN: [id number]  EVALUATION DATE & TIME: 11/15/2023  7:39 AM    PCP: Hayden Orellana    ED PROVIDER: Inez Gatica M.D.        Chief Complaint   Patient presents with    Epistaxis         FINAL IMPRESSION:    1. Epistaxis            MEDICAL DECISION MAKIN year old male with history of obesity and prior nasal procedure who presents emergency department with spontaneous epistaxis.  Ultimately bleeding was controlled with bilateral balloon packing and TXA intranasally.  Hemodynamically stable.  Discharged to follow-up with his ENT tomorrow morning at 11 AM.  Patient understands what to watch for and when to return the ER and all of his questions have been answered.      ED COURSE:  7:45 AM  I met with the patient to gather history and perform my exam. ED course and treatment discussed. Nasal bleeding and unable to keep nasal clamp in place. Seems like left side bleeding.     8:19 AM  Pt with active epistaxis bilaterally though suspect left side is the main issue. I had patient blow his nose and I placed 2 squirts of afrin into each nare. I placed a left 7.5 nasal balloon and air instilled until cushion firm. Still bleeding and some now on right side. Placed a 7.5 nasal balloon on right and air instilled. Pt tolerated very well, but even after continuing to add air for 20 min slowly he continued to bleed. Left was removed and a new one was soaked in TXA liquid (3mL) and placed back into left nare. Pt tolerated well. Bleeding has now slowed significantly. Will leave in place and recheck in a few minutes. RN to place IV and will give some IVF and check Hb and coags.    8:27 AM  Rechecked on patient. He feels like the bleeding is slowing.No longer having bleeding externally around the packing. He is calm and breathing calmly now. RN getting IV now. Will recheck again in a bit.    8:52  AM  Bleeding continues to be controlled.  He is in quite a bit of pain.  He arrived by EMS.  We will give him some IV fentanyl and Zofran and also a dose of oral oxycodone.    9:15 AM  Spoke with [facility], Dr. Jackson, and she recommends that he keep his 11am appointment.  They would like the packing in place for 24 hours.  They do not feel that antibiotics are warranted at this time and I agree.    9:48 AM  Patient continues to be controlled at this time.  Patient still quite bit of pain from the bilateral packing.  Try another dose of IV pain medication while we will wait the oral medication to kick in.  He agrees with the plan to follow-up with his ENT tomorrow at 11 AM as prior scheduled.    10:27 AM  Bleeding continues to be controlled. He wishes to go home and will d.c home with pain meds.  He understands what to watch for and when to return to the ER.  All of his questions have been answered.      At the conclusion of the encounter I discussed the results of all of the tests and the disposition. Their questions were answered. The patient (and any family present) acknowledged understanding and were agreeable with the care plan.      CRITICAL CARE TIME    Total critical care time: 30 minutes  Critical care time was exclusive of separately billable procedures and treating other patients.  Critical care was necessary to treat or prevent imminent or life-threatening deterioration of the following conditions: brisk presumed posterior epistaxis requiring bilateral nasal packing and TXA treatment  Critical care was time spent personally by me on the following activities: development of treatment plan with patient or surrogate, discussions with consultants, examination of patient, evaluation of patient's response to treatment, obtaining history from patient or surrogate, ordering and performing treatments and interventions, ordering and review of laboratory studies, ordering and review of radiographic  studies and re-evaluation of patient's condition, this excludes any separately billable procedures.        CONSULTANTS:  [facility] - Dr. Mon        MEDICATIONS GIVEN IN THE EMERGENCY:  Medications   oxymetazoline (AFRIN) 0.05 % spray 2 spray (2 sprays Nasal $Given 11/15/23 0748)   tranexamic acid (CYKLOKAPRON) TOPICAL (injection used topically) (1,000 mg Topical $Given 11/15/23 0811)   sodium chloride 0.9% BOLUS 1,000 mL (0 mLs Intravenous Stopped 11/15/23 0921)   oxyCODONE (ROXICODONE) tablet 5 mg (5 mg Oral $Given 11/15/23 0858)   fentaNYL (PF) (SUBLIMAZE) injection 50 mcg (50 mcg Intravenous $Given 11/15/23 0859)   ondansetron (ZOFRAN) injection 4 mg (4 mg Intravenous $Given 11/15/23 0859)   morphine (PF) injection 4 mg (4 mg Intravenous $Given 11/15/23 0953)           NEW PRESCRIPTIONS STARTED AT TODAY'S ER VISIT     Medication List        Started      ondansetron 4 MG ODT tab  Commonly known as: ZOFRAN ODT  4 mg, Oral, EVERY 8 HOURS PRN     oxyCODONE 5 MG tablet  Commonly known as: ROXICODONE  1-2 tabs PO q4hr PRN pain. Do not drive.  Do not mix wih alcohol.                  CONDITION:  Stable, improved        DISPOSITION:  D.c home         =================================================================  =================================================================  TRIAGE ASSESSMENT:  Patient has had blood nose for 45 minutes, no injury, nose clamp in place, blood is flowing out continuously from nose. Patient denies any blood thinners             ED Triage Vitals   Enc Vitals Group      BP 11/15/23 0740 (!) 122/93      Pulse 11/15/23 0740 67      Resp 11/15/23 0740 18      Temp 11/15/23 0740 98.6  F (37  C)      Temp src 11/15/23 0740 Oral      SpO2 11/15/23 0740 96 %      Weight 11/15/23 0749 148.3 kg (327 lb)          ================================================================  ================================================================    HPI    Patient information was  obtained from: patient    Use of Intrepreter: N/A     Rm Ordoñez is a 48 year old male with history of obesity, nasal surgeries, who presents to the ER with complaints of epistaxis.  Began about 45 minutes prior to arrival.  Patient arrives by EMS.  He denies any trauma.  He states he was awake prior to this beginning.  Tried a nasal clamp without any success.  He denies being on any blood thinning medications.      Had a nasal procedure at [facility] about 1 month ago but does not recall the name of the procedure.    REVIEW OF SYSTEMS  Review of Systems   Constitutional:  Negative for fever.   HENT:  Positive for nosebleeds.    Cardiovascular:  Negative for chest pain.   Gastrointestinal:  Negative for abdominal pain.   Hematological:  Does not bruise/bleed easily.   All other systems reviewed and are negative.          PAST MEDICAL HISTORY:  Past Medical History:   Diagnosis Date    CARDIOVASCULAR SCREENING; LDL GOAL LESS THAN 160 2/3/2012    Obesity (BMI 30-39.9)     Obstructive sleep apnea          PAST SURGICAL HISTORY:  Past Surgical History:   Procedure Laterality Date    ENT SURGERY      tonsilectomy, septum repair, sleep apnea         CURRENT MEDICATIONS:    Prior to Admission medications    Medication Sig Start Date End Date Taking? Authorizing Provider   order for DME Equipment being ordered: CPAP  Patient Rm Ordoñez was set up at [place] on November 12, 2015. Patient received a Resmed AirCurve 10 Bilevel. Pressures were set at [id number].   Patient s ramp is 7 cm H2O for Auto and FLEX/EPR is EPR.  Patient received a [employer] Mask name: SIMPLUS  Full Face mask Size Medium, heated tubing and heated humidifier.  Patient is enrolled in the STM Program and does need to meet compliance. Patient has a follow up on TBD with Dr. Mcclendon.   Cecile Fernando    Reported, Patient         ALLERGIES:  No Known Allergies      FAMILY HISTORY:  No family history on  file.      SOCIAL HISTORY:  Social History     Socioeconomic History    Marital status: Single   Occupational History    Occupation: [employer]     Employer: [employer]   Tobacco Use    Smoking status: Never    Smokeless tobacco: Never   Substance and Sexual Activity    Alcohol use: No    Drug use: No    Sexual activity: Yes     Partners: Female   Other Topics Concern    Parent/sibling w/ CABG, MI or angioplasty before 65F 55M? No         VITALS:  Patient Vitals for the past 24 hrs:   BP Temp Temp src Pulse Resp SpO2 Weight   11/15/23 0915 -- -- -- 58 -- 96 % --   11/15/23 0900 (!) 180/87 -- -- 60 -- 93 % --   11/15/23 0749 -- -- -- -- -- -- 148.3 kg (327 lb)   11/15/23 0740 (!) 122/93 98.6  F (37  C) Oral 67 18 96 % --       Wt Readings from Last 3 Encounters:   11/15/23 148.3 kg (327 lb)   09/23/20 148.3 kg (327 lb)   10/17/17 (!) 146.1 kg (322 lb)       CrCl cannot be calculated (Unknown ideal weight.).    PHYSICAL EXAM    Constitutional:  Well developed, Well nourished, NAD, GCS 15  HENT:  Normocephalic, Atraumatic, Bilateral external ears normal, +epistaxis (L>R bleeding), brisk bleeding. Nasal clamp wont stay on his nose. Neck- Supple, No stridor.   Eyes:  PERRL, EOMI, Conjunctiva normal, No discharge.  Respiratory:  Normal breath sounds, No respiratory distress, No wheezing, Speaks full sentences easily. No cough.   Cardiovascular:  Normal heart rate, Regular rhythm, No rubs, No gallops. Chest wall nontender.   GI:  + obesity.  Bowel sounds normal, Soft, No tenderness, No masses, No flank tenderness. No rebound or guarding.   : deferred  Musculoskeletal:  No cyanosis, No clubbing. Good range of motion in all major joints. No major deformities noted.   Integument:  Warm, Dry, No erythema, No rash.  No petechiae.   Neurologic:  Alert & oriented x 3  Psychiatric:  Affect normal, Cooperative         LAB:  All pertinent labs reviewed and interpreted.  Recent Results (from the past 24 hour(s))   Extra Blue Top  "Tube    Collection Time: 11/15/23  8:35 AM   Result Value Ref Range    Hold Specimen JIC    Extra Red Top Tube    Collection Time: 11/15/23  8:35 AM   Result Value Ref Range    Hold Specimen JIC    Extra Green Top (Lithium Heparin) Tube    Collection Time: 11/15/23  8:35 AM   Result Value Ref Range    Hold Specimen JIC    Extra Purple Top Tube    Collection Time: 11/15/23  8:35 AM   Result Value Ref Range    Hold Specimen JIC    Extra Blood Bank Purple Top Tube    Collection Time: 11/15/23  8:35 AM   Result Value Ref Range    Hold Specimen JIC    Basic metabolic panel    Collection Time: 11/15/23  8:35 AM   Result Value Ref Range    Sodium 140 135 - 145 mmol/L    Potassium 4.4 3.4 - 5.3 mmol/L    Chloride 103 98 - 107 mmol/L    Carbon Dioxide (CO2) 26 22 - 29 mmol/L    Anion Gap 11 7 - 15 mmol/L    Urea Nitrogen 14.5 6.0 - 20.0 mg/dL    Creatinine 0.95 0.67 - 1.17 mg/dL    GFR Estimate >90 >60 mL/min/1.73m2    Calcium 9.0 8.6 - 10.0 mg/dL    Glucose 146 (H) 70 - 99 mg/dL   CBC (+ platelets, no diff)    Collection Time: 11/15/23  8:35 AM   Result Value Ref Range    WBC Count 14.9 (H) 4.0 - 11.0 10e3/uL    RBC Count 5.00 4.40 - 5.90 10e6/uL    Hemoglobin 14.4 13.3 - 17.7 g/dL    Hematocrit 42.6 40.0 - 53.0 %    MCV 85 78 - 100 fL    MCH 28.8 26.5 - 33.0 pg    MCHC 33.8 31.5 - 36.5 g/dL    RDW 13.8 10.0 - 15.0 %    Platelet Count 386 150 - 450 10e3/uL   INR    Collection Time: 11/15/23  8:35 AM   Result Value Ref Range    INR 1.14 0.85 - 1.15   PTT    Collection Time: 11/15/23  8:35 AM   Result Value Ref Range    aPTT 32 22 - 38 Seconds       No results found for: \"ABORH\"        RADIOLOGY:  Reviewed all pertinent imaging. Please see official radiology report.    No orders to display         EKG:    none        PROCEDURES:  [facility]    -Epistaxis Mgmt    Date/Time: 11/15/2023 8:20 AM    Performed by: Inez Gatica MD  Authorized by: Inez Gatica MD    Risks, benefits and " alternatives discussed.      ANESTHESIA (see MAR for exact dosages)     Anesthesia method:  None  PROCEDURE DETAILS     Treatment site:  Unable to specify    Treatment method:  Nasal balloon    Treatment complexity:  Extensive    Treatment episode: no recurrence of recent bleed      POST PROCEDURE     Assessment:  Bleeding stopped    PROCEDURE  Describe Procedure: Initial bleeding seem to be greatest from the left nare and nasal balloon 7.5 was placed.  Bleeding continued and therefore 7.5 right balloon was placed.  Bleeding still unable to be controlled and therefore left balloon was removed, new balloon was coated in liquid TXA and replaced.  Ultimately this helped to establish hemostasis.  Patient tolerated very well.  Patient Tolerance:  Patient tolerated the procedure well with no immediate complications          Medical Decision Making    History:  Supplemental history from: Documented in chart, if applicable  External Record(s) reviewed: Documented in chart, if applicable.    Work Up:  Chart documentation includes differential considered and any EKGs or imaging independently interpreted by provider, where specified.  In additional to work up documented, I considered the following work up: Documented in chart, if applicable.    External consultation:  Discussion of management with another provider: Documented in chart, if applicable    Complicating factors:  Care impacted by chronic illness: Other: prior nasal procedures  Care affected by social determinants of health: Access to Medical Care    Disposition considerations: Discharge. I prescribed additional prescription strength medication(s) as charted. I considered admission, but ultimately discharged patient but at this time bleeding has been controlled for over 1 hour..      Inez Gatica M.D. [facility]  Emergency Medicine and Medical Toxicology  [facility] EMERGENCY DEPARTMENT  [address]  [facility] [address]  [phone]  Dept: [phone]           Inez Gatica MD  11/15/23 1037

## 2023-11-17 ENCOUNTER — HOSPITAL ENCOUNTER (EMERGENCY)
Facility: HOSPITAL | Age: 48
Discharge: HOME | End: 2023-11-17
Attending: EMERGENCY MEDICINE | Admitting: EMERGENCY MEDICINE
Payer: COMMERCIAL

## 2023-11-17 VITALS
OXYGEN SATURATION: 100 % | SYSTOLIC BLOOD PRESSURE: 132 MMHG | HEART RATE: 79 BPM | DIASTOLIC BLOOD PRESSURE: 76 MMHG | TEMPERATURE: 97.4 F | RESPIRATION RATE: 20 BRPM

## 2023-11-17 DIAGNOSIS — R04.0 EPISTAXIS: ICD-10-CM

## 2023-11-17 PROCEDURE — 99283 EMERGENCY DEPT VISIT LOW MDM: CPT

## 2023-11-17 RX ORDER — OXYMETAZOLINE HYDROCHLORIDE 0.05 G/100ML
2 SPRAY NASAL ONCE
Status: COMPLETED | OUTPATIENT
Start: 2023-11-17 | End: 2023-11-17

## 2023-11-17 ASSESSMENT — ACTIVITIES OF DAILY LIVING (ADL): ADLS_ACUITY_SCORE: 35

## 2023-11-17 NOTE — ED PROVIDER NOTES
Emergency Department Encounter     Evaluation Date & Time:   No admission date for patient encounter.    CHIEF COMPLAINT:  Epistaxis      Triage Note:      Patient here for nose bleed, was here Wednesday for similar had rhino rockets in place they were removed today and by the time he went home his nose was re bleeding.       Impression and Plan       FINAL IMPRESSION:    ICD-10-CM    1. Epistaxis  R04.0           ED COURSE & MEDICAL DECISION MAKIN:30 PM Medical student saw patient for initial evaluation.   4:54 PM Met with the patient and performed my initial exam.    48 year old male, history of morbid obesity and s/p balloon sinoplasty ~3 weeks ago ([facility] in [place]), who presents for evaluation of epistaxis that started ~2 hours ago after BL RhinoRockets were removed at ENT follow-up today. They placed a clotting material in each naris, however he blew his nose and the material came out. Denies any injuries to his nose and is not anticoagulated.     I had planned to administer Afrin and then place packing soaked with LET in each naris to attempt hemostasis, however when I met with the patient, he felt as though the bleeding had stopped.     Patient monitored for an additional hour without recurrence of bleeding.     I do not think packing and / or blood work is indicated.     Patient discharged to home with follow-up with his ENT Wednesday, as previously scheduled. Return precautions provided. Patient stable throughout ED course.       At the conclusion of the encounter I discussed the results of all the tests and the disposition. The questions were answered. The patient acknowledged understanding and was agreeable with the care plan.        Medical Decision Making    History:  Supplemental history from: Documented in chart, if applicable  External Record(s) reviewed: Inpatient Record: ED visit to [facility] Emergency Department for epistaxis on 11/15/2023    Work  Up:  Chart documentation includes differential considered and any EKGs or imaging independently interpreted by provider, where specified.  In additional to work up documented, I considered the following work up: Documented in chart, if applicable.    External consultation:  Discussion of management with another provider: Documented in chart, if applicable    Complicating factors:  Care impacted by chronic illness: Other: Morbid obesity  Care affected by social determinants of health: N/A    Disposition considerations: Discharge. No recommendations on prescription strength medication(s). N/A.       MEDICATIONS GIVEN IN THE EMERGENCY DEPARTMENT:  Medications   oxymetazoline (AFRIN) 0.05 % spray 2 spray (2 sprays Nasal Not Given 11/17/23 1807)   lido-EPINEPHrine-tetracaine (LET) topical gel GEL ( Topical Not Given 11/17/23 1807)       NEW PRESCRIPTIONS STARTED AT TODAY'S ED VISIT:  Discharge Medication List as of 11/17/2023  6:20 PM          HPI     The history is provided by the patient and the spouse. No  was used.        Rm Ordoñez is a 48 year old male, history of morbid obesity and ENT surgery ~3 weeks ago, who presents to this ED for evaluation of epistaxis.    Per chart review, the patient was seen on 11/15/23 in the ED for evaluation of epistaxis. During this visit, the patient's bleeding was controlled with bilateral balloon packing and TXA intranasally. He was discharged with instruction to follow up with ENT.    The patient was seen in this ED 2 days ago for nosebleed after balloon sinoplasty ~3 weeks ago. He had BL RhinoRockets soaked in TXA placed with hemostasis achieved. He followed with ENT today where they removed the packing and placed a clotting material in each naris. No bleeding when he left the ENT Clinic, but ~2 hours ago after he returned home, he started to have bleeding from the left side. He did blow his nose and the clotting material came out. Denies any injuries.  Is not anticoagulated. He endorses associated mild lightheadedness due to frequent nose bleeds, but denies any pain. He reports s/p balloon sinoplasty on 10/26/23.    He otherwise denies shortness of breath, nausea / vomiting, fevers or other concerns.    Medical History     Past Medical History:   Diagnosis Date    CARDIOVASCULAR SCREENING; LDL GOAL LESS THAN 160 2/3/2012    Obesity (BMI 30-39.9)     Obstructive sleep apnea        Past Surgical History:   Procedure Laterality Date    ENT SURGERY      tonsilectomy, septum repair, sleep apnea       No family history on file.    Social History     Tobacco Use    Smoking status: Never    Smokeless tobacco: Never   Substance Use Topics    Alcohol use: No    Drug use: No       ondansetron (ZOFRAN ODT) 4 MG ODT tab  order for DME  oxyCODONE (ROXICODONE) 5 MG tablet        Physical Exam     First Vitals:  Patient Vitals for the past 24 hrs:   BP Temp Temp src Pulse Resp SpO2   11/17/23 1819 132/76 -- -- 79 20 100 %   11/17/23 1616 (!) 143/73 97.4  F (36.3  C) Temporal 87 24 96 %       PHYSICAL EXAM:   Physical Exam    GENERAL: Awake, alert.  In no acute distress.   HEENT: Normocephalic, atraumatic. Nose is hemostatic. There is a gauze-like material visible in the right naris. There is a small amount of blood in each naris with no active bleeding or lesion amenable to cautery.  NECK: No stridor.  PULMONARY: Symmetrical breath sounds without distress.  Lungs clear to auscultation bilaterally without wheezes, rhonchi or rales.  CARDIO: Regular rate and rhythm.  No significant murmur, rub or gallop.   ABDOMINAL: Abdomen soft, non-distended and non-tender to palpation.   NEURO: Alert and oriented to person, place and time.  Cranial nerves grossly intact.  No focal motor deficit.  PSYCH: Normal mood and affect.      I, Gracie Wong, am serving as a scribe to document services personally performed by Hellen Gaona MD based on my observation and the provider's statements to me.  I, Hellen Gaona MD attest that Gracie Wong is acting in a scribe capacity, has observed my performance of the services and has documented them in accordance with my direction.    Hellen Gaona MD  Emergency Medicine  [facility] EMERGENCY DEPARTMENT           Hellen Gaona MD  11/18/23 1775

## 2023-11-17 NOTE — ED TRIAGE NOTES
Patient here for nose bleed, was here Wednesday for similar had rhino rockets in place they were removed today and by the time he went home his nose was re bleeding.

## 2023-11-18 NOTE — DISCHARGE INSTRUCTIONS
Please follow-up with your ENT next Wednesday, as previously arranged.     Return to the ER for recurrent nosebleed that does not stop with application of pressure with nasal clamp (15 minutes x 2), if you pass out or feel that you might or other concerns.

## 2024-01-30 ASSESSMENT — SLEEP AND FATIGUE QUESTIONNAIRES
HOW LIKELY ARE YOU TO NOD OFF OR FALL ASLEEP IN A CAR, WHILE STOPPED FOR A FEW MINUTES IN TRAFFIC: SLIGHT CHANCE OF DOZING
HOW LIKELY ARE YOU TO NOD OFF OR FALL ASLEEP WHILE SITTING QUIETLY AFTER LUNCH WITHOUT ALCOHOL: SLIGHT CHANCE OF DOZING
HOW LIKELY ARE YOU TO NOD OFF OR FALL ASLEEP WHEN YOU ARE A PASSENGER IN A CAR FOR AN HOUR WITHOUT A BREAK: SLIGHT CHANCE OF DOZING
HOW LIKELY ARE YOU TO NOD OFF OR FALL ASLEEP WHILE WATCHING TV: SLIGHT CHANCE OF DOZING
HOW LIKELY ARE YOU TO NOD OFF OR FALL ASLEEP WHILE SITTING AND TALKING TO SOMEONE: SLIGHT CHANCE OF DOZING
HOW LIKELY ARE YOU TO NOD OFF OR FALL ASLEEP WHILE LYING DOWN TO REST IN THE AFTERNOON WHEN CIRCUMSTANCES PERMIT: MODERATE CHANCE OF DOZING
HOW LIKELY ARE YOU TO NOD OFF OR FALL ASLEEP WHILE SITTING INACTIVE IN A PUBLIC PLACE: WOULD NEVER DOZE
HOW LIKELY ARE YOU TO NOD OFF OR FALL ASLEEP WHILE SITTING AND READING: SLIGHT CHANCE OF DOZING

## 2024-01-31 ENCOUNTER — OFFICE VISIT (OUTPATIENT)
Dept: SLEEP MEDICINE | Facility: CLINIC | Age: 49
End: 2024-01-31
Payer: COMMERCIAL

## 2024-01-31 VITALS
OXYGEN SATURATION: 97 % | BODY MASS INDEX: 40.36 KG/M2 | HEIGHT: 72 IN | RESPIRATION RATE: 16 BRPM | DIASTOLIC BLOOD PRESSURE: 86 MMHG | SYSTOLIC BLOOD PRESSURE: 137 MMHG | HEART RATE: 65 BPM | WEIGHT: 298 LBS

## 2024-01-31 DIAGNOSIS — G47.33 OBSTRUCTIVE SLEEP APNEA: Primary | ICD-10-CM

## 2024-01-31 PROCEDURE — 99204 OFFICE O/P NEW MOD 45 MIN: CPT | Performed by: INTERNAL MEDICINE

## 2024-01-31 RX ORDER — FLUTICASONE PROPIONATE 50 MCG
SPRAY, SUSPENSION (ML) NASAL
COMMUNITY
Start: 2023-08-21

## 2024-01-31 NOTE — PATIENT INSTRUCTIONS
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
## 2024-02-08 ENCOUNTER — DOCUMENTATION ONLY (OUTPATIENT)
Dept: SLEEP MEDICINE | Facility: CLINIC | Age: 49
End: 2024-02-08
Payer: COMMERCIAL

## 2024-02-08 DIAGNOSIS — G47.33 OBSTRUCTIVE SLEEP APNEA (ADULT) (PEDIATRIC): Primary | ICD-10-CM

## 2024-02-08 NOTE — PROGRESS NOTES
Patient was offered choice of vendor and chose [facility].  Patient Rm Ordoñez was set up at [place]  on February 8, 2024. Patient received a Resmed Aircurve 10 Pressures were set at  21 IPAP, 10 EPAP cm H2O.   Patient s ramp is 5 cm H2O for 20 min.  Patient received a [employer] Mask name: VITERA  Full Face mask size Medium, heated tubing and heated humidifier.  Patient has the following compliance requirements: none    Angel Acosta

## 2024-02-15 NOTE — PROGRESS NOTES
SUBJECTIVE:                                                    Rm Ordoñez is a 42 year old male who presents to clinic today for the following health issues:      Musculoskeletal problem/pain      Duration: 3 months ago,     Description  Location: left foot, heel and top of foot    Intensity:  moderate, severe    Accompanying signs and symptoms: none    History  Previous similar problem: no   Previous evaluation:  none    Precipitating or alleviating factors:  Trauma or overuse: YES- pt was walking from parking ramp, big gap between driveway and sidewalk and twisted ankle and hit ankle hard on ground. Felt pain after sitting at desk and trying to walk.   Aggravating factors include: walking    Therapies tried and outcome: ice    3 months ago - left heel injury while walking on sidewalk. On and off pain. Happened at work.  Does not impair his activities at work.  On and off random pain.     Problem list and histories reviewed & adjusted, as indicated.  Additional history: as documented    Labs reviewed in EPIC    Reviewed and updated as needed this visit by clinical staff    Reviewed and updated as needed this visit by Provider      Social History     Social History     Marital status: Single     Spouse name: N/A     Number of children: N/A     Years of education: N/A     Occupational History     IT Department [place]     Social History Main Topics     Smoking status: Never Smoker     Smokeless tobacco: Never Used     Alcohol use No     Drug use: No     Sexual activity: Not Currently     Other Topics Concern     Parent/Sibling W/ Cabg, Mi Or Angioplasty Before 65f 55m? No     Social History Narrative     No Known Allergies  Patient Active Problem List   Diagnosis     CARDIOVASCULAR SCREENING; LDL GOAL LESS THAN 160     Obstructive sleep apnea     Obesity (BMI 30-39.9)     Reviewed medications, social history and  past medical and surgical history.    Review of system: for general, respiratory, CVS, GI and  [facility]    Discharge instructions, which include signs and symptoms to monitor patient for, as well as detailed information regarding accidental drug overdose provided.  All questions and concerns addressed at this time.      Patient leaves ER in no apparent distress. This RN provided education regarding returning to the ER for any new concerns or changes in patient's condition.      BP (!) 121/57 Comment: RN aware  Pulse 105   Temp 36.7 °C (98 °F) (Temporal)   Resp 24   Wt 22 kg (48 lb 8 oz)   SpO2 95%      "psychiatry negative except for noted above.     EXAM:  /88 (Cuff Size: Adult Large)  Pulse 74  Temp 99.3  F (37.4  C) (Oral)  Ht 5' 11\" (1.803 m)  Wt (!) 315 lb 12 oz (143.2 kg)  SpO2 95%  BMI 44.04 kg/m2  Constitutional: healthy, alert and no distress   Psychiatric: mentation appears normal and affect normal/bright  JOINT/EXTREMITIES: left heel - plantar surface tenderness, rest of ankle exam negative.     ASSESSMENT / PLAN:  (M80.460) Pain of left heel  (primary encounter diagnosis)  Comment: xray negative. If not improving, see podiatrist. Offered meds, he wishes to use OTC meds. Supportive shoes.   Plan: XR Calcaneus Left G/E 2 Views                    "

## 2024-04-10 ENCOUNTER — OFFICE VISIT (OUTPATIENT)
Dept: FAMILY MEDICINE | Facility: CLINIC | Age: 49
End: 2024-04-10
Payer: COMMERCIAL

## 2024-04-10 VITALS
HEIGHT: 72 IN | RESPIRATION RATE: 15 BRPM | OXYGEN SATURATION: 99 % | DIASTOLIC BLOOD PRESSURE: 84 MMHG | WEIGHT: 315 LBS | TEMPERATURE: 97.3 F | BODY MASS INDEX: 42.66 KG/M2 | SYSTOLIC BLOOD PRESSURE: 126 MMHG | HEART RATE: 53 BPM

## 2024-04-10 DIAGNOSIS — R35.0 URINARY FREQUENCY: ICD-10-CM

## 2024-04-10 DIAGNOSIS — Z12.11 SCREEN FOR COLON CANCER: ICD-10-CM

## 2024-04-10 DIAGNOSIS — E66.01 MORBID OBESITY (H): ICD-10-CM

## 2024-04-10 DIAGNOSIS — K59.00 CONSTIPATION, UNSPECIFIED CONSTIPATION TYPE: Primary | ICD-10-CM

## 2024-04-10 LAB
ALBUMIN UR-MCNC: NEGATIVE MG/DL
APPEARANCE UR: CLEAR
BILIRUB UR QL STRIP: NEGATIVE
COLOR UR AUTO: YELLOW
GLUCOSE UR STRIP-MCNC: NEGATIVE MG/DL
HBA1C MFR BLD: 5.8 % (ref 0–5.6)
HGB UR QL STRIP: NEGATIVE
HOLD SPECIMEN: NORMAL
KETONES UR STRIP-MCNC: NEGATIVE MG/DL
LEUKOCYTE ESTERASE UR QL STRIP: NEGATIVE
NITRATE UR QL: NEGATIVE
PH UR STRIP: 7 [PH] (ref 5–7)
SP GR UR STRIP: 1.02 (ref 1–1.03)
UROBILINOGEN UR STRIP-ACNC: 0.2 E.U./DL

## 2024-04-10 PROCEDURE — 83036 HEMOGLOBIN GLYCOSYLATED A1C: CPT | Performed by: FAMILY MEDICINE

## 2024-04-10 PROCEDURE — 99214 OFFICE O/P EST MOD 30 MIN: CPT | Performed by: FAMILY MEDICINE

## 2024-04-10 PROCEDURE — 84443 ASSAY THYROID STIM HORMONE: CPT | Performed by: FAMILY MEDICINE

## 2024-04-10 PROCEDURE — 81003 URINALYSIS AUTO W/O SCOPE: CPT | Performed by: FAMILY MEDICINE

## 2024-04-10 PROCEDURE — 36415 COLL VENOUS BLD VENIPUNCTURE: CPT | Performed by: FAMILY MEDICINE

## 2024-04-10 PROCEDURE — G0103 PSA SCREENING: HCPCS | Performed by: FAMILY MEDICINE

## 2024-04-10 ASSESSMENT — PAIN SCALES - GENERAL: PAINLEVEL: NO PAIN (0)

## 2024-04-10 NOTE — PATIENT INSTRUCTIONS
Bowel clean out - Miralax 14 caps (238 gram)  in 64 oz of clear liquids (any liquid is fine - gatorade would be ideal).  Allow to sit in fridge for 30-60 minutes to allow the miralax to fully dissolve.  Then drink 1 glass every 15-30 minutes over the next 3-4 hours. Take 2 senna pills (over the counter)   During the cleanout, drink only clear liquids. At the end of the clean out stools should be liquid and see through.    Go for colonoscopy.     If urinary frequency not improving after constipation treatment - will refer you to urologist.     =======================================  FYI:     System will autorelease results as soon as they are available. I usually wait for all results to be ready before sending you a comment or message.  Be assured I will review and comment on all of your results as soon as I can.     I am at [facility]  ([phone]) usually Monday, Tuesday and Wednesday.   Messages, evisits, phone calls received on Thursday and Friday may not get responded very urgently but I will try to respond as soon as possible.     2) My schedule sometime been booking out far in advance. I apologize for the lack of timely access. If you need to be seen for a chronic condition or preventive (wellness) visit, please be sure to schedule that appointment few months in advance.  If you have a concern that you feel cannot wait until my next available appointment (such as a hospital follow-up or new symptom of concern) please ask to speak to one of the [facility] nurses who may be able to access a sooner appointment.      You can schedule a video visit for follow-up appointments as well as future appointments for certain conditions.  Please see the below link.     Video Visits ([email or web address])     If you have not already done so,  I encourage you to sign up for [employer] ([email or web address]).  This will allow you to review your results, securely communicate with a  provider, and schedule virtual visits as well.

## 2024-04-10 NOTE — PROGRESS NOTES
Assessment & Plan     Constipation, unspecified constipation type  Most likely primary culprit of his urinary frequency.  He has significant constipation symptoms and we discussed about bowel cleanout first.  He is also due for colonoscopy and will do a referral.  We will hold off on imaging but if symptoms are persistent consider abdominal x-ray or CT scan.  He agreed.  - TSH with free T4 reflex; Future  - TSH with free T4 reflex    Urinary frequency  UA negative.  Awaiting PSA.  Overflow incontinence can be a possibility but I suspect mostly from chronic constipation he may be having urinary frequency.  His morbid obesity, prediabetes can also contribute.  He is restricting fluid and avoiding stimulants in the evening.  If symptoms are persistent consider urology referral.  - UA Macroscopic with reflex to Microscopic and Culture - Lab Collect; Future  - Hemoglobin A1c; Future  - PSA, screen; Future  - UA Macroscopic with reflex to Microscopic and Culture - Lab Collect  - Hemoglobin A1c  - PSA, screen    Screen for colon cancer     - Colonoscopy Screening  Referral; Future    Morbid obesity (H)  Rule out thyroid etiology.  - TSH with free T4 reflex; Future  - TSH with free T4 reflex            BMI  Estimated body mass index is 43.07 kg/m  as calculated from the following:    Height as of this encounter: 1.829 m (6').    Weight as of this encounter: 144.1 kg (317 lb 9.6 oz).   Weight management plan: Discussed healthy diet and exercise guidelines          Ian Abdalla is a 48 year old, presenting for the following health issues:  Urinary Problem and Diabetes      4/10/2024     1:02 PM   Additional Questions   Roomed by Gricelda mac     History of Present Illness       Reason for visit:  I urinate alot  and i want to be checked for diabetes and my prostate checked    He eats 0-1 servings of fruits and vegetables daily.He consumes 1 sweetened beverage(s) daily.He exercises with enough effort to increase  his heart rate 9 or less minutes per day.  He exercises with enough effort to increase his heart rate 3 or less days per week.   He is taking medications regularly.    Drinking water through out the day. Urinating every hour. It is annoying. No blood, pain.   No black urine. No change in smell of urine. No history of kidney stone.   Urgency present. Stream is not weak. If can't reach bathroom - some leaking of urine. No coughing or sneezing leaking. Constipation present. Used dulcolax but no major benefit. Once every 2 weeks using stool softner.   Passing stool once every 2 days but not much.   No excessive thirst. No excessive caffeine.  Wondering about diabetes, thyroid, prostate etc.             Objective    /84 (BP Location: Right arm, Patient Position: Sitting, Cuff Size: Adult Large)   Pulse 53   Temp 97.3  F (36.3  C) (Temporal)   Resp 15   Ht 1.829 m (6')   Wt 144.1 kg (317 lb 9.6 oz)   SpO2 99%   BMI 43.07 kg/m    Body mass index is 43.07 kg/m .  Physical Exam               Signed Electronically by: Hayden Orellana MD, MD

## 2024-04-11 LAB
PSA SERPL DL<=0.01 NG/ML-MCNC: 0.74 NG/ML (ref 0–2.5)
TSH SERPL DL<=0.005 MIU/L-ACNC: 1.18 UIU/ML (ref 0.3–4.2)

## 2024-05-23 ENCOUNTER — OFFICE VISIT (OUTPATIENT)
Dept: UROLOGY | Facility: CLINIC | Age: 49
End: 2024-05-23
Payer: COMMERCIAL

## 2024-05-23 VITALS
DIASTOLIC BLOOD PRESSURE: 87 MMHG | OXYGEN SATURATION: 95 % | HEIGHT: 72 IN | TEMPERATURE: 97.5 F | BODY MASS INDEX: 42.06 KG/M2 | WEIGHT: 310.5 LBS | SYSTOLIC BLOOD PRESSURE: 133 MMHG | HEART RATE: 55 BPM

## 2024-05-23 DIAGNOSIS — R35.0 URINARY FREQUENCY: ICD-10-CM

## 2024-05-23 PROCEDURE — 99204 OFFICE O/P NEW MOD 45 MIN: CPT | Performed by: STUDENT IN AN ORGANIZED HEALTH CARE EDUCATION/TRAINING PROGRAM

## 2024-05-23 RX ORDER — MIRABEGRON 25 MG/1
25 TABLET, FILM COATED, EXTENDED RELEASE ORAL DAILY
Qty: 90 TABLET | Refills: 1 | Status: SHIPPED | OUTPATIENT
Start: 2024-05-23

## 2024-05-23 ASSESSMENT — PAIN SCALES - GENERAL: PAINLEVEL: NO PAIN (0)

## 2024-05-23 NOTE — PROGRESS NOTES
Chief Complaint:   Urinary frequency         History of Present Illness:   Rm Ordoñez is a 48 year old male with a history of EVERETT who presents for evaluation of urinary frequency.     The patient reports a lifelong history of urinary frequency every hour and nocturia x 2-3. He reports urgency with occasional urge incontinence. He is not wearing pads. He denies a weak stream and sensation of incomplete bladder. He denies dysuria and gross hematuria.     He reports constipation. He recently did a bowel clean out and did not see improvement. He has a bowel movement every three days. He is going to have a colonoscopy for further evaluation.     He drinks water. He denies coffee, tea, and soda intake.    He has sleep apnea and wears a CPAP.          Past Medical History:     Past Medical History:   Diagnosis Date    CARDIOVASCULAR SCREENING; LDL GOAL LESS THAN 160 2/3/2012    Obesity (BMI 30-39.9)     Obstructive sleep apnea             Past Surgical History:     Past Surgical History:   Procedure Laterality Date    ENT SURGERY      tonsilectomy, septum repair, sleep apnea            Medications     Current Outpatient Medications   Medication Sig Dispense Refill    fluticasone (FLONASE) 50 MCG/ACT nasal spray  (Patient not taking: Reported on 4/10/2024)      order for DME Equipment being ordered: CPAP  Patient Rm Ordoñez was set up at [place] on November 12, 2015. Patient received a [employer] AirCurve 10 Bilevel. Pressures were set at [id number].   Patient s ramp is 7 cm H2O for Auto and FLEX/EPR is EPR.  Patient received a [employer] Mask name: SIMPLUS  Full Face mask Size Medium, heated tubing and heated humidifier.  Patient is enrolled in the STM Program and does need to meet compliance. Patient has a follow up on TBD with Dr. Mcclendon.   Cecile Fernando       No current facility-administered medications for this visit.            Allergies:   Patient has no known allergies.          Review of Systems:  From intake questionnaire   Negative 14 system review except as noted on HPI, nurse's note.         Physical Exam:   Patient is a 48 year old  male   Vitals: Blood pressure 133/87, pulse 55, temperature 97.5  F (36.4  C), temperature source Tympanic, height 1.829 m (6'), weight 140.8 kg (310 lb 8 oz), SpO2 95%.  General Appearance Adult: Alert, no acute distress, oriented.  Lungs: Non-labored breathing.  Heart: No obvious jugular venous distension present.  Neuro: Alert, oriented, speech and mentation normal      Labs and Pathology:    I personally reviewed all applicable laboratory data and went over findings with patient  Significant for:    CBC RESULTS:  Recent Labs   Lab Test 11/15/23  0835 10/17/17  0941   WBC 14.9* 11.2*   HGB 14.4 15.5    323        BMP RESULTS:  Recent Labs   Lab Test 11/15/23  0835      POTASSIUM 4.4   CHLORIDE 103   CO2 26   ANIONGAP 11   *   BUN 14.5   CR 0.95   GFRESTIMATED >90   ASHER 9.0       UA RESULTS:   Recent Labs   Lab Test 04/10/24  1256 09/23/20  1123 10/17/17  0915   SG 1.020 1.025 1.015   URINEPH 7.0 7.0 7.5*   NITRITE Negative Negative Negative   RBCU  --  O - 2  --    WBCU  --  0 - 5  --        PSA RESULTS  PSA   Date Value Ref Range Status   10/17/2017 0.40 0 - 4 ug/L Final     Comment:     Assay Method:  Chemiluminescence using Siemens Vista analyzer     Prostate Specific Antigen Screen   Date Value Ref Range Status   04/10/2024 0.74 0.00 - 2.50 ng/mL Final            Assessment and Plan:     Assessment: 48 year old male seen in evaluation for a lifelong history of urinary frequency and urgency.     We discussed overactive bladder as the likely cause for his symptoms. We discussed possible treatment options including pelvic floor PT, anticholinergics and B3 agonists, and third line therapies such as intravesical Botox and Interstim. The patient is interested in medication.     I will prescribe Myrbetriq to avoid worsening  constipation. If this is expensive, the patient was advised to send me a message and I will prescribe an anticholinergic.     Plan:  Start Myrbetriq 25 mg daily. Side effects reviewed.   Follow up in 2-3 months, sooner if concerns.     KEKE CRAWFORD PA-C  Department of Urology

## 2024-06-19 ENCOUNTER — HOSPITAL ENCOUNTER (OUTPATIENT)
Facility: CLINIC | Age: 49
End: 2024-06-19
Attending: INTERNAL MEDICINE | Admitting: INTERNAL MEDICINE
Payer: COMMERCIAL

## 2024-06-19 ENCOUNTER — TELEPHONE (OUTPATIENT)
Dept: GASTROENTEROLOGY | Facility: CLINIC | Age: 49
End: 2024-06-19
Payer: COMMERCIAL

## 2024-06-19 DIAGNOSIS — Z12.11 SPECIAL SCREENING FOR MALIGNANT NEOPLASMS, COLON: Primary | ICD-10-CM

## 2024-06-19 NOTE — TELEPHONE ENCOUNTER
"Endoscopy Scheduling Screen    Have you had a positive Covid test in the last 14 days?  No    What is your communication preference for Instructions and/or Bowel Prep?   MyChart    What insurance is in the chart?  Other:  [facility], [facility]    Ordering/Referring Provider: Hayden Orellana MD   (If ordering provider performs procedure, schedule with ordering provider unless otherwise instructed. )    BMI: Estimated body mass index is 42.11 kg/m  as calculated from the following:    Height as of 5/23/24: 1.829 m (6').    Weight as of 5/23/24: 140.8 kg (310 lb 8 oz).     Sedation Ordered  moderate sedation.   If patient BMI > 50 do not schedule in ASC.    If patient BMI > 45 do not schedule at [facility].    Are you taking methadone or Suboxone?  No    Have you had difficulties, pain, or discomfort during past endoscopy procedures?  No    Are you taking any prescription medications for pain 3 or more times per week?   NO, No RN review required.    Do you have a history of malignant hyperthermia?  No    (Females) Are you currently pregnant?   No     Have you been diagnosed or told you have pulmonary hypertension?   No    Do you have an LVAD?  No    Have you been told you have moderate to severe sleep apnea?  Yes (RN Review required for scheduling unless scheduling in Hospital.) CPAP    Have you been told you have COPD, asthma, or any other lung disease?  No    Do you have any heart conditions?  No     Have you ever had or are you waiting for an organ transplant?  No. Continue scheduling, no site restrictions.    Have you had a stroke or transient ischemic attack (TIA aka \"mini stroke\" in the last 6 months?   No    Have you been diagnosed with or been told you have cirrhosis of the liver?   No    Are you currently on dialysis?   No    Do you need assistance transferring?   No    BMI: Estimated body mass index is 42.11 kg/m  as calculated from the following:    Height as of 5/23/24: 1.829 m (6').    Weight as of 5/23/24: 140.8 " kg (310 lb 8 oz).     Is patients BMI > 40 and scheduling location [facility]?  Yes (If MAC sedation is ordered, schedule PAC eval)    Do you take an injectable medication for weight loss or diabetes (excluding insulin)?  No    Do you take the medication Naltrexone?  No    Do you take blood thinners?  No       Prep   Are you currently on dialysis or do you have chronic kidney disease?  No    Do you have a diagnosis of diabetes?  No    Do you have a diagnosis of cystic fibrosis (CF)?  No    On a regular basis do you go 3 -5 days between bowel movements?  Yes (Extended Prep)    BMI > 40?  Yes (Extended Prep)    Preferred Pharmacy:        [facility] - [place], [place]  [address]  [facility] [address]  Phone: [phone] Fax: [phone]        Final Scheduling Details     Procedure scheduled  Colonoscopy    Surgeon:  Jan     Date of procedure:  10/02/2024     Pre-OP / PAC:   No - Not required for this site.    Location  [facility] - Patient preference.    Sedation   Moderate Sedation - Per order.      Patient Reminders:   You will receive a call from a Nurse to review instructions and health history.  This assessment must be completed prior to your procedure.  Failure to complete the Nurse assessment may result in the procedure being cancelled.      On the day of your procedure, please designate an adult(s) who can drive you home stay with you for the next 24 hours. The medicines used in the exam will make you sleepy. You will not be able to drive.      You cannot take public transportation, ride share services, or non-medical taxi service without a responsible caregiver.  Medical transport services are allowed with the requirement that a responsible caregiver will receive you at your destination.  We require that drivers and caregivers are confirmed prior to your procedure.

## 2024-07-06 ENCOUNTER — HEALTH MAINTENANCE LETTER (OUTPATIENT)
Age: 49
End: 2024-07-06

## 2024-08-18 ASSESSMENT — SLEEP AND FATIGUE QUESTIONNAIRES
HOW LIKELY ARE YOU TO NOD OFF OR FALL ASLEEP WHEN YOU ARE A PASSENGER IN A CAR FOR AN HOUR WITHOUT A BREAK: SLIGHT CHANCE OF DOZING
HOW LIKELY ARE YOU TO NOD OFF OR FALL ASLEEP IN A CAR, WHILE STOPPED FOR A FEW MINUTES IN TRAFFIC: WOULD NEVER DOZE
HOW LIKELY ARE YOU TO NOD OFF OR FALL ASLEEP WHILE SITTING AND TALKING TO SOMEONE: SLIGHT CHANCE OF DOZING
HOW LIKELY ARE YOU TO NOD OFF OR FALL ASLEEP WHILE WATCHING TV: MODERATE CHANCE OF DOZING
HOW LIKELY ARE YOU TO NOD OFF OR FALL ASLEEP WHILE SITTING QUIETLY AFTER LUNCH WITHOUT ALCOHOL: SLIGHT CHANCE OF DOZING
HOW LIKELY ARE YOU TO NOD OFF OR FALL ASLEEP WHILE SITTING INACTIVE IN A PUBLIC PLACE: SLIGHT CHANCE OF DOZING
HOW LIKELY ARE YOU TO NOD OFF OR FALL ASLEEP WHILE SITTING AND READING: MODERATE CHANCE OF DOZING
HOW LIKELY ARE YOU TO NOD OFF OR FALL ASLEEP WHILE LYING DOWN TO REST IN THE AFTERNOON WHEN CIRCUMSTANCES PERMIT: HIGH CHANCE OF DOZING

## 2024-08-20 ENCOUNTER — OFFICE VISIT (OUTPATIENT)
Dept: SLEEP MEDICINE | Facility: CLINIC | Age: 49
End: 2024-08-20
Payer: COMMERCIAL

## 2024-08-20 VITALS
OXYGEN SATURATION: 95 % | BODY MASS INDEX: 42.39 KG/M2 | DIASTOLIC BLOOD PRESSURE: 89 MMHG | WEIGHT: 313 LBS | HEART RATE: 68 BPM | SYSTOLIC BLOOD PRESSURE: 141 MMHG | HEIGHT: 72 IN

## 2024-08-20 DIAGNOSIS — G47.33 OSA (OBSTRUCTIVE SLEEP APNEA): Primary | ICD-10-CM

## 2024-08-20 PROCEDURE — 99213 OFFICE O/P EST LOW 20 MIN: CPT | Performed by: INTERNAL MEDICINE

## 2024-08-20 NOTE — PROGRESS NOTES
[facility]   Outpatient Sleep Medicine Follow-up Visit  August 20, 2024    Name: Rm Ordoñez MRN# [id number]   Age: 49 year old YOB: 1975     Date of Consultation: August 20, 2024  Consultation is requested by: No referring provider defined for this encounter.  Primary care provider: Hayden Orellana           Assessment and Plan:     Sleep Diagnoses:  Severe obstructive sleep apnea    Comorbid conditions:  BMI 42    Summary Recommendations:  Return to clinic in 1 month or earlier if you have recurrent problems of difficulty initiating or maintaining sleep, snoring or nonrestorative sleep  Orders placed for supplies including new fullface mask.  Orders         History of Present Illness:     Rm Ordoñez is a 49 year old male with severe obstructive sleep apnea and hypoxemia currently on bilevel positive airway pressure support with a pressure of 21/10 and average use of 8 hours with residual AHI of less than 3.  He is having significant mask leak which he notices primarily in the morning and results in some water loss overnight in his humidifier.  His mask is currently a fullface mask over 6 months old.         PREVIOUS SLEEP TESTING: PSG  DATE: 2015  .7 Treated with BIPAP 23/11         Most Recent SCALES:    EPWORTH SLEEPINESS SCALE WITHIN 1 YEAR WITHIN 10 DAYS   Sitting and reading 2 2   Watching TV 2 2   Sitting, inactive in a public place (theatre or mtg.) 1  1    As a passenger in a car 1 1   Lying down to rest in the afternoon when circumstance permit 3 3   Sitting and talking to someone 1 1   Sitting quietly after lunch without alcohol 1 1   In a car, while stopped for a few minutes in traffic 0 0   TOTAL SCORE 11 11   Normal < 11         0--none    1--mild    2--moderate  3--severe      INSOMNIA SEVERITY INDEX WITHIN 1 YEAR   Difficulty falling asleep 0   Difficult staying asleep 3   Problems waking up to early 2   How  SATISFIED/DISSATISFIED are you with your CURRENT sleep pattern? 3   How NOTICEABLE to others do you think your sleep pattern is in terms of your quality of life? 2   How WORRIED/DISTRESSED are you about your current sleep pattern? 2   To what extent do you consider your sleep problem to INTERFERE with your daily fuctioning(e.g. daytime fatigue, mood, ability to function at work/daily chores, concentration, mood,etc.) CURRENTLY? 2   INSOMNIA SEVERITY INDEX TOTAL SCORE 14    --absence of insomnia (0-7); sub-threshold insomnia (8-14); moderate insomnia (15-21); and severe insomnia (22-28)--      Objective:  CPAP Compliance Targets:   >70% days > 4 hours AHI < 5   30 days ending August 20, 2024  Mask type:  Full Face Mask   ResMed   Bilevel PAP 21/10  100% of days with > 4 hours of use. 0/30 days with no use.   Average use 479 minutes per day.   95%ile Leak 99.1 L/min.   AHI 2.49 events per hour.                   Medications:     Current Outpatient Medications   Medication Sig Dispense Refill    fluticasone (FLONASE) 50 MCG/ACT nasal spray  (Patient not taking: Reported on 4/10/2024)      mirabegron (MYRBETRIQ) 25 MG 24 hr tablet Take 1 tablet (25 mg) by mouth daily 90 tablet 1    order for DME Equipment being ordered: CPAP  Patient Rm Ordoñez was set up at [place] on November 12, 2015. Patient received a [employer] AirCurve 10 Bilevel. Pressures were set at [id number]-10.   Patient s ramp is 7 cm H2O for Auto and FLEX/EPR is EPR.  Patient received a [employer] Mask name: SIMPLUS  Full Face mask Size Medium, heated tubing and heated humidifier.  Patient is enrolled in the STM Program and does need to meet compliance. Patient has a follow up on TBD with Dr. Mcclendon.   Ceclie Fernando       No current facility-administered medications for this visit.        No Known Allergies         Problem List:     Patient Active Problem List   Diagnosis    CARDIOVASCULAR SCREENING; LDL GOAL LESS THAN 160     Obstructive sleep apnea    Obesity (BMI 30-39.9)    Morbid obesity (H)            Past Medical History:     Does not need 02 supplement at night   Past Medical History:   Diagnosis Date    CARDIOVASCULAR SCREENING; LDL GOAL LESS THAN 160 2/3/2012    Obesity (BMI 30-39.9)     Obstructive sleep apnea              Past Surgical History:      Past Surgical History:   Procedure Laterality Date    ENT SURGERY      tonsilectomy, septum repair, sleep apnea              Physical Examination:     Mandibular profile  Reported vitals:  There were no vitals taken for this visit.   GENERAL: alert and no distress  EYES: Eyes grossly normal to inspection.  No discharge or erythema, or obvious scleral/conjunctival abnormalities.  RESP: No audible wheeze, cough, or visible cyanosis.    SKIN: Visible skin clear. No significant rash, abnormal pigmentation or lesions.  NEURO: Cranial nerves grossly intact.  Mentation and speech appropriate for age.  PSYCH: Appropriate affect, tone, and pace of words        Copy to: Hayden Orellana MD 8/20/2024         Total time spent reviewing medical records including previous testing and interpretation as well as direct patient contact and documentation on this date: 20 min

## 2024-09-11 RX ORDER — BISACODYL 5 MG/1
TABLET, DELAYED RELEASE ORAL
Qty: 4 TABLET | Refills: 0 | Status: SHIPPED | OUTPATIENT
Start: 2024-09-11

## 2024-09-18 ENCOUNTER — TELEPHONE (OUTPATIENT)
Dept: GASTROENTEROLOGY | Facility: CLINIC | Age: 49
End: 2024-09-18
Payer: COMMERCIAL

## 2024-09-18 NOTE — TELEPHONE ENCOUNTER
Call placed to patient to go over pre assessment for upcoming colonoscopy.    Patient answered the phone, states wants to cancel.    Transferred to scheduling.    Kristy Louise RN  Endoscopy Procedure Pre Assessment RN  [phone] option 4

## 2024-09-18 NOTE — TELEPHONE ENCOUNTER
Caller: Rm Ordoñez   Reason for Reschedule/Cancellation (please be detailed, any staff messages or encounters to note?):     Per pt - cancel only       Prior to reschedule please review:  Ordering Provider:Hayden Orellana MD    Sedation Determined: mod   Does patient have any ASC Exclusions, please identify?:  n       Notes on Cancelled Procedure:  Procedure:Lower Endoscopy [Colonoscopy]   Date: 10/02/2024  Location:[facility]; [address] [place] [address], [address], [place] [address]  Surgeon: giselle         Rescheduled:  no

## 2025-02-03 ENCOUNTER — DOCUMENTATION ONLY (OUTPATIENT)
Dept: SLEEP MEDICINE | Facility: CLINIC | Age: 50
End: 2025-02-03
Payer: COMMERCIAL

## 2025-02-03 DIAGNOSIS — G47.33 OBSTRUCTIVE SLEEP APNEA (ADULT) (PEDIATRIC): Primary | ICD-10-CM

## 2025-02-27 ENCOUNTER — OFFICE VISIT (OUTPATIENT)
Dept: SLEEP MEDICINE | Facility: CLINIC | Age: 50
End: 2025-02-27
Payer: COMMERCIAL

## 2025-02-27 VITALS
SYSTOLIC BLOOD PRESSURE: 136 MMHG | HEIGHT: 72 IN | HEART RATE: 58 BPM | WEIGHT: 307.8 LBS | DIASTOLIC BLOOD PRESSURE: 83 MMHG | BODY MASS INDEX: 41.69 KG/M2 | OXYGEN SATURATION: 96 %

## 2025-02-27 DIAGNOSIS — E66.01 MORBID OBESITY (H): ICD-10-CM

## 2025-02-27 DIAGNOSIS — G47.33 OBSTRUCTIVE SLEEP APNEA: Primary | ICD-10-CM

## 2025-02-27 ASSESSMENT — SLEEP AND FATIGUE QUESTIONNAIRES
HOW LIKELY ARE YOU TO NOD OFF OR FALL ASLEEP WHEN YOU ARE A PASSENGER IN A CAR FOR AN HOUR WITHOUT A BREAK: SLIGHT CHANCE OF DOZING
HOW LIKELY ARE YOU TO NOD OFF OR FALL ASLEEP IN A CAR, WHILE STOPPED FOR A FEW MINUTES IN TRAFFIC: WOULD NEVER DOZE
HOW LIKELY ARE YOU TO NOD OFF OR FALL ASLEEP WHILE SITTING INACTIVE IN A PUBLIC PLACE: SLIGHT CHANCE OF DOZING
HOW LIKELY ARE YOU TO NOD OFF OR FALL ASLEEP WHILE SITTING QUIETLY AFTER LUNCH WITHOUT ALCOHOL: SLIGHT CHANCE OF DOZING
HOW LIKELY ARE YOU TO NOD OFF OR FALL ASLEEP WHILE LYING DOWN TO REST IN THE AFTERNOON WHEN CIRCUMSTANCES PERMIT: MODERATE CHANCE OF DOZING
HOW LIKELY ARE YOU TO NOD OFF OR FALL ASLEEP WHILE SITTING AND TALKING TO SOMEONE: SLIGHT CHANCE OF DOZING
HOW LIKELY ARE YOU TO NOD OFF OR FALL ASLEEP WHILE SITTING AND READING: MODERATE CHANCE OF DOZING
HOW LIKELY ARE YOU TO NOD OFF OR FALL ASLEEP WHILE WATCHING TV: SLIGHT CHANCE OF DOZING

## 2025-02-27 NOTE — PATIENT INSTRUCTIONS
Need to get mask and leak under control, then do overnight oximetry    For general sleep health questions:   https://www.thensf.org/sleep-health-topics/  http://sleepeducation.org    Medicare and Medicaid patients starting on CPAP or biPAP on or after 5/12/2023  Medicare patients should schedule an IN PERSON CLINIC appointment to provide your CPAP/biPAP usage data to a provider within 90 days of starting CPAP    For new ResMed devices, sign up for device lester to monitor your device for your followup visits  We encourage you to utilize the alooma lester or website (myAir web Viewpoints) to monitor your therapy progress and share the data with your healthcare team when you discuss your sleep apnea.                                                      Know your equipment:  CPAP is continuous positive airway pressure that prevents obstructive sleep apnea by keeping the throat from collapsing while you are sleeping. In most cases, the device is  smart  and can slowly self-adjusts if your throat collapses and keeps a record every day of how well you are treated-this information is available to you and your care team.  BPAP is bilevel positive airway pressure that keeps your throat open and also assists each breath with a pressure boost to maintain adequate breathing.  Special kinds of BPAP are used in patients who have inadequate breathing from lung or heart disease. In most cases, the device is  smart  and can slowly self-adjusts to assist breathing. Like CPAP, the device keeps a record of how well you are treated.  Your mask is your connection to the device. You get to choose what feels most comfortable and the staff will help to make sure if fits.     *Masks with magnets:  Updated Contraindications  Masks with magnetic components are contraindicated for use by patients where they, or anyone in close physical contact while using the mask, have the following:   Active medical implants that interact with magnets  (i.e., pacemakers, implantable cardioverter defibrillators (ICD), neurostimulators, cerebrospinal fluid (CSF) shunts, insulin/infusion pumps)   Metallic implants/objects containing ferromagnetic material (i.e., aneurysm clips/flow disruption devices, embolic coils, stents, valves, electrodes, implants to restore hearing or balance with implanted magnets, ocular implants, metallic splinters in the eye)  Updated Warning  Keep the mask magnets at a safe distance of at least 6 inches (150 mm) away from implants or medical devices that may be adversely affected by magnetic interference. This warning applies to you or anyone in close physical contact with your mask. The magnets are in the frame and lower headgear clips, with a magnetic field strength of up to 400mT. When worn, they connect to secure the mask but may inadvertently detach while asleep.  Implants/medical devices, including those listed within contraindications, may be adversely affected if they change function under external magnetic fields or contain ferromagnetic materials that attract/repel to magnetic fields (some metallic implants, e.g., contact lenses with metal, dental implants, metallic cranial plates, screws, tremayne hole covers, and bone substitute devices). Consult your physician and  of your implant / other medical device for information on the potential adverse effects of magnetic fields.      Continue PAP therapy every night, for all hours that you are sleeping (including naps.)  As always, try to get at least 8 hours of sleep or more each day, keep a regular sleep schedule, and avoid sleep deprivation. Avoid alcohol.  Reasons that you might need a change to your pressure therapy would be weight gain or loss, waking having inadvertently removed your PAP overnight, having previously felt refreshed by sleep with CPAP use and now waking un-refreshed, and return of daytime sleepiness. Also, the development of new medical problems  (such as  heart failure, stroke, medications such as narcotics) can sometimes affect breathing at night and change your PAP therapy needs.  Please bring PAP with you if you are hospitalized.  If anticipating surgery be sure to discuss with your surgeon that you have sleep apnea and use PAP therapy.    Maintain your equipment as recommended which includes routine cleaning and replacement of supplies.      Call DME for any questions regarding supplies or maintenance.    [facility], [facility] [phone]    Do not drive on engage in potentially dangerous activities if feeling sleepy.    Please follow up in sleep clinic again in 12 months.        Tips for your PAP use-    Mask fitting tips  Mask fitting exercise:    To improve your mask seal and your mobility at night, put mask on and secure in place.  Lie down in bed with full pressure and roll to one side, adjust headgear while in that position to eliminate any leaks. Repeat process rolling to other side.     The mask seal does not have to be perfect:   CPAP machines are designed to make up for small leaks. However, you will not tolerate leaks blowing in your eyes so you will need to adjust.   Any leak should only be near or at the bottom of the mask.  We expect your mask to leak slightly at night.    Do not over-tighten the headgear straps, tighter IS NOT better, we expect minimal leak.    First try re-positioning the mask or headgear before tightening the headgear straps.  Mask leaks are expected due to changing sleeping positions. Try pulling the mask away from your skin allowing the cushion to re-inflate will minimize the leak.  If you struggle for a good fit, try turning the CPAP off and then readjust the mask by pulling it away from your face and then turning back on the CPAP.        Humidifier tips  Humidifiers can be adjusted to increase or decrease the amount of moisture according to your comfort level. You may need to  adjust this frequently at first, but then might only change it with seasonal weather changes.     Try INCREASING the humidity if:  You experience a dry, irritated nasal passage or throat.  You have a runny, drippy nose or sneezing fits after using CPAP.  You experience nasal congestion during or after CPAP use.    Try DECREASING the humidity if:  You have excessive condensation or  rain out  in the tubing or mask.  Otherwise keep the tubing warm during the night by running it underneath the blankets or pillow.      Clinic visit after initial PAP set-up   Bring your equipment with you to your 5-8 week follow up clinic visit.  We will be extracting your data from the machine if not available from the cloud based [employer].        Travel  Always take your equipment with you when you travel.  If you fly with your equipment bring it on with you as a carry on.  Medical equipment does not count as a carry on.    If you travel international the machines take 110-240v.  The only adapter needed is the adapter that will fit into the receptacle (outlet).    You may also want to bring an extension cord as many hotel rooms have limited outlets at the bedside.  Do not travel with water in your humidifier chamber.     Cleaning and Maintenance Guidelines    Equipment Frequency Cleaning Method   Mask First Day    Daily      Weekly Soak mask in hot soapy water for 30 minutes, rinse and air dry.  Wipe nasal cushion with a hot soapy (Ivory, baby shampoo) cloth and rinse.  Baby wipes may also be used.  Do not use anti-bacterial soaps,Carmen  liquid soap, rubbing alcohol, bleach or ammonia.  Wash frame in hot soapy water (Ivory, baby shampoo) rinse and let air dry   Headgear Biweekly Wash in hot soapy water, rinse and air dry   Reusable Gray Filter Weekly Wash in hot soapy water, rinse, put in towel squeeze moisture out, let air dry   Disposable White Filter Check Weekly Replace when brown or gray in color; at least every 2 to 3 months    Humidifier Chamber Daily    Weekly Empty distilled water from humidifier and let air dry    Hand wash in hot soapy water, rinse and air dry   Tubing Weekly Wash in hot soapy water, rinse and let air dry   Mask, Tubing and Humidifier Chamber As needed Disinfect: Soak in 1 part distilled white vinegar to 3 parts hot water for 30 minutes, rinse well and air dry  Not the material headgear        MASK AND SUPPLY REORDERING and EQUIPMENT NEEDS through your DME and per your insurance  Reminder: Most insurance companies will allow for a new mask, headgear, tubing, and reusable gray filter every six months.  Disposable white ultra-fine filters are covered monthly.      HOME AND SAFETY INSTRUCTIONS  Do not use frayed or cracked electrical cords, multi plug adaptors, or switched receptacles  Do not immerse electrical equipment into water  Assure that electrical cords do not become a tripping hazard     Your BMI is Body mass index is 41.74 kg/m .    Being overweight does not necessarily mean you will have health consequences.  Those who have BMI over 30 or over 27 with existing medical conditions carries greater risk.     Weight loss decreases severity of sleep apnea in most people with obesity. For those with mild obesity who have developed snoring with weight gain, even 15-30 pound weight loss can improve and occasionally milder eliminate sleep apnea.  Structured and life-long dietary and health habits are necessary to lose weight and keep healthier weight levels.     The Comprehensive Weight loss program offers all aspects of weight loss strategies including two Non-Surgical Weight Loss Programs: Medical Weight Management and our 24 Week Healthy Lifestyle Program:  Medical Weight Management: You will meet with a Medical Weight Management Provider, as well as a Registered Dietician. The program may include medication therapy, dietary education, recommended exercise and physical therapy programs, monthly support group  meetings, and possible psychological counseling. Follow up visits with the provider or dietician are scheduled based on your progress and needs.  24 Week Healthy Lifestyle Program: This unique program is designed to give you the support of weekly appointments and activities thru a 24-week period. It may include all of the components of the basic program (above), with the addition of 11 individual Health  Visits, 24-week access to the [employer] website for over 700 online classes, and monthly support group meetings. This program has an out-of-pocket expense of $499 to cover the items that can not be billed to insurance (health coaches and [employer] access), and is non-refundable/non-transferable (you may be able to use a Health Savings Account; ask your HSA provider). There may be an optional meal replacement plan prescribed as well.     Medication therapy has been approved for the treatment of sleep apnea: The FDA approved tirzepatide for moderate to severe sleep apnea (apnea-hypopnea index greater than or equal to 15) in patients with BMI of greater than or equal to 30, or greater than or equal to 27 with at least 1 weight-related condition such as hypertension or dyslipidemia.    Surgical management achieves meaningful long-term weight loss and improvement in health risks in most patients with more severe obesity.

## 2025-02-27 NOTE — PROGRESS NOTES
Chief complaint: Needs to be seen for supplies    Last sleep medicine visit 8/20/2024 Johan Almazan MD    History of Present Illness: 49-year-old gentleman with history of significant obstructive sleep apnea with hypoxemia.  He needed his CPAP supplies but his provider recommended that he follow-up in clinic.  He reports today that he feels that things are going well with his bilevel.  He uses it nightly.  Denies any problems with sleep quality.  No difficulties with sleep initiation.  He wakes up feeling refreshed.  He is not taking naps.    Reviewing download with him today it does appear that he has significant leak in the system that appears to be triggering the machine to restart over and over and over.  Looking back at previous downloads this has been there consistently for quite a while.  Patient is not aware of this.  Machine is relatively new.    He denies symptoms of restless legs, sleepwalking, sleep talking or dream enactment behavior.  No abnormal movement activity during the day.  He does have a family history of unusual and rare neurodegenerative disorder (neurodegeneration with brain iron accumulation )of which she is not displaying any symptoms at this time.     [place] Sleepiness Scale  Total score - [place]: (Patient-Rptd) 9 (2/27/2025 12:49 PM)   (Less than 10 normal)    Insomnia Severity Scale  CHONG Total Score: (Patient-Rptd) 17  (normal 0-7, mild 8-14, moderate 15-21, severe 22-28)    Past Medical History:   Diagnosis Date    CARDIOVASCULAR SCREENING; LDL GOAL LESS THAN 160 2/3/2012    Obesity (BMI 30-39.9)     Obstructive sleep apnea        No Known Allergies    No current outpatient medications on file.     No current facility-administered medications for this visit.       Social History     Socioeconomic History    Marital status: Single     Spouse name: Not on file    Number of children: Not on file    Years of education: Not on file    Highest education level: Not on file   Occupational  "History    Occupation: IT Department     Employer: [employer]   Tobacco Use    Smoking status: Never    Smokeless tobacco: Never   Vaping Use    Vaping status: Never Used   Substance and Sexual Activity    Alcohol use: No    Drug use: No    Sexual activity: Yes     Partners: Female   Other Topics Concern    Parent/sibling w/ CABG, MI or angioplasty before 65F 55M? No   Social History Narrative    Not on file     Social Drivers of Health     Financial Resource Strain: Not on file   Food Insecurity: Not on file   Transportation Needs: Not on file   Physical Activity: Not on file   Stress: Not on file   Social Connections: Not on file   Interpersonal Safety: Low Risk  (4/10/2024)    Interpersonal Safety     Do you feel physically and emotionally safe where you currently live?: Yes     Within the past 12 months, have you been hit, slapped, kicked or otherwise physically hurt by someone?: No     Within the past 12 months, have you been humiliated or emotionally abused in other ways by your partner or ex-partner?: No   Housing Stability: Not on file       Family History   Problem Relation Age of Onset    Neurodegenerative disease Father         NBIA    Neurodegenerative disease Sister     Neurodegenerative disease Brother            EXAM:  /83   Pulse 58   Ht 1.829 m (6' 0.01\")   Wt (!) 139.6 kg (307 lb 12.8 oz)   SpO2 96%   BMI 41.74 kg/m    GENERAL: alert and no distress  EYES: Eyes grossly normal to inspection.  No discharge or erythema, or obvious scleral/conjunctival abnormalities.  RESP: No audible wheeze, cough, or visible cyanosis.    SKIN: Visible skin clear. No significant rash, abnormal pigmentation or lesions.  NEURO: Cranial nerves grossly intact.  Mentation and speech appropriate for age.  PSYCH: Appropriate affect, tone, and pace of words       PSG split 11/6/2015 [facility] [place]  Weight 295 lbs, BMI 40.1, ESS 16  No REM sleep seen on diagnostic  .7, Lowest O2 Sat 56%, " "baseline sat 86  Time with oxygen saturation at or below 88% 75 minutes  Bilevel 21/11    PSG 2005 [place], trialed CPAP failed, then underwent UPPP    ResMed Air air curve 10 VautoPAP download from 1/28/2025 to 2/26/2025 reviewed:  Per cent of days used greater than 4 Hours 97% (minimum goal greater than 70%)  Average use on days used: 7 hours 41 min  Settings: EPAP 10 cmH2O, IPAP 21  Average AHI 8.2 (3.3 centrals) events per hour (goal less than 5)  Leak markedly excessive    TSH   Date Value Ref Range Status   04/10/2024 1.18 0.30 - 4.20 uIU/mL Final     No results found for: \"OBDULIA\"   Lab Results   Component Value Date    A1C 5.8 04/10/2024    A1C 5.2 09/23/2020          ASSESSMENT:  49-year-old gentleman with obesity, severe obstructive sleep apnea with hypoxemia during the PSG.  Download shows good compliance and he reports good benefit.  However, systemic leak is causing the machine to restart over and over again.  Concern and there is a residually elevated AHI.  Although some of it is central apnea per the device.    PLAN:  Orders generated for updated CPAP supplies.  Strongly urged him to work with his medical supplier to have this system investigated to find the source of the leak and address it as best as possible.  Once he is addressed the leak he should proceed with overnight oximetry to confirm resolution of hypoxemia.  Continue efforts at weight management.  We discussed the new medication that has been approved for sleep apnea.  If he has further interested in this he should discuss this with primary care.  See instructions for further details of counseling provided.  Follow-up in 1 year.    *** minutes spent by me on the date of the encounter doing chart review, history and exam, documentation and further activities per the note    Reyna Martinez M.D.  Pulmonary/Critical Care/Sleep Medicine    [facility]   [address]oor [address], [address]   [address] [place] [address]   Appointments: [phone]    The above note was dictated using voice recognition software and may include typographical errors. Please contact the author for any clarifications.

## 2025-02-27 NOTE — Clinical Note
Patient really needs some kind of appointment to have his machine and the whole system investigated for massive leak.  Look at his detailed data.  The machine seems to be restarting over and over and over again.  This needs to be addressed he is he has a residually elevated AHI.  Once this is addressed and improved please proceed with overnight oximetry.  Thank you if someone could call him to set up a time that would be great. LILIAM

## 2025-03-14 ENCOUNTER — TRANSFERRED RECORDS (OUTPATIENT)
Dept: HEALTH INFORMATION MANAGEMENT | Facility: CLINIC | Age: 50
End: 2025-03-14
Payer: COMMERCIAL

## 2025-04-26 ENCOUNTER — OFFICE VISIT (OUTPATIENT)
Dept: URGENT CARE | Facility: URGENT CARE | Age: 50
End: 2025-04-26
Payer: COMMERCIAL

## 2025-04-26 VITALS
SYSTOLIC BLOOD PRESSURE: 138 MMHG | WEIGHT: 302 LBS | DIASTOLIC BLOOD PRESSURE: 84 MMHG | TEMPERATURE: 97.5 F | OXYGEN SATURATION: 96 % | BODY MASS INDEX: 40.9 KG/M2 | HEIGHT: 72 IN | HEART RATE: 64 BPM

## 2025-04-26 DIAGNOSIS — H66.92 LEFT ACUTE OTITIS MEDIA: Primary | ICD-10-CM

## 2025-04-26 PROCEDURE — 3075F SYST BP GE 130 - 139MM HG: CPT | Performed by: FAMILY MEDICINE

## 2025-04-26 PROCEDURE — 3079F DIAST BP 80-89 MM HG: CPT | Performed by: FAMILY MEDICINE

## 2025-04-26 PROCEDURE — 99213 OFFICE O/P EST LOW 20 MIN: CPT | Performed by: FAMILY MEDICINE

## 2025-04-26 RX ORDER — AMOXICILLIN 500 MG/1
1000 CAPSULE ORAL 2 TIMES DAILY
Qty: 28 CAPSULE | Refills: 0 | Status: SHIPPED | OUTPATIENT
Start: 2025-04-26 | End: 2025-05-03

## 2025-04-26 NOTE — PROGRESS NOTES
Urgent Care Clinic Visit    Chief Complaint   Patient presents with    Urgent Care     Sore throat ear pain x 2 days

## 2025-04-26 NOTE — PROGRESS NOTES
Assessment:       Left acute otitis media  - amoxicillin (AMOXIL) 500 MG capsule  Dispense: 28 capsule; Refill: 0         Plan:     Patient with acute otitis media of the left ear.  We will treat with amoxicillin.  Recommend Tylenol or ibuprofen as needed for fever or discomfort.  Follow-up if symptoms are getting worse or not improving.  Recommend ear recheck with PCP in 3 weeks to ensure resolution.      MEDICATIONS:   Orders Placed This Encounter   Medications    amoxicillin (AMOXIL) 500 MG capsule     Sig: Take 2 capsules (1,000 mg) by mouth 2 times daily for 7 days.     Dispense:  28 capsule     Refill:  0       Subjective:       49 year old male presents for evaluation of a couple day history of nasal congestion and cough with progressively worsening ear pain and pressure.  Has had a mild sore throat as well.  He has complained of his lymph nodes feeling swollen.  Shortness of breath or wheezing.  Denies fevers.    Patient Active Problem List   Diagnosis    CARDIOVASCULAR SCREENING; LDL GOAL LESS THAN 160    Obstructive sleep apnea    Obesity (BMI 30-39.9)    Morbid obesity (H)       Past Medical History:   Diagnosis Date    CARDIOVASCULAR SCREENING; LDL GOAL LESS THAN 160 2/3/2012    Obesity (BMI 30-39.9)     Obstructive sleep apnea        Past Surgical History:   Procedure Laterality Date    ENT SURGERY      tonsilectomy, septum repair, sleep apnea       Current Outpatient Medications   Medication Sig Dispense Refill    amoxicillin (AMOXIL) 500 MG capsule Take 2 capsules (1,000 mg) by mouth 2 times daily for 7 days. 28 capsule 0     No current facility-administered medications for this visit.       No Known Allergies    Family History   Problem Relation Age of Onset    Neurodegenerative disease Father         NBIA    Neurodegenerative disease Sister     Neurodegenerative disease Brother        Social History     Socioeconomic History    Marital status: Single   Occupational History    Occupation: IT  Department     Employer: [employer]   Tobacco Use    Smoking status: Never    Smokeless tobacco: Never   Vaping Use    Vaping status: Never Used   Substance and Sexual Activity    Alcohol use: No    Drug use: No    Sexual activity: Yes     Partners: Female   Other Topics Concern    Parent/sibling w/ CABG, MI or angioplasty before 65F 55M? No     Social Drivers of Health     Interpersonal Safety: Low Risk  (4/10/2024)    Interpersonal Safety     Do you feel physically and emotionally safe where you currently live?: Yes     Within the past 12 months, have you been hit, slapped, kicked or otherwise physically hurt by someone?: No     Within the past 12 months, have you been humiliated or emotionally abused in other ways by your partner or ex-partner?: No         Review of Systems  Pertinent items are noted in HPI.      Objective:                   General Appearance:    /84 (BP Location: Right arm, Patient Position: Sitting, Cuff Size: Adult Large)   Pulse 64   Temp 97.5  F (36.4  C) (Tympanic)   Ht 1.829 m (6')   Wt (!) 137 kg (302 lb)   SpO2 96%   BMI 40.96 kg/m          Alert, pleasant, cooperative, no distress, appears stated age   Head:    Normocephalic, without obvious abnormality, atraumatic   Eyes:    Conjunctiva/corneas clear   Ears:  Left tympanic membrane is erythematous and bulging with a purulent effusion present.  Serous effusion noted on the right middle ear.  TM on the right is not erythematous or bulging.  Ear canals normal bilaterally.   Nose:   Nares normal, septum midline, mucosa normal, no drainage    or sinus tenderness   Throat:   Lips, mucosa, and tongue normal; teeth and gums normal.  No tonsilar hypertrophy or exudate.   Neck:   Supple, symmetrical, trachea midline, no adenopathy    Lungs:     Clear to auscultation bilaterally without wheezes, rales, or rhonchi, respirations unlabored    Heart:    Regular rate and rhythm, S1 and S2 normal, no murmur, rub or gallop       Extremities:    Extremities normal, atraumatic, no cyanosis or edema   Skin:   Skin color, texture, turgor normal, no rashes or lesions         This note has been dictated using voice recognition software. Any grammatical or context distortions are unintentional and inherent to the software

## 2025-05-06 ENCOUNTER — OFFICE VISIT (OUTPATIENT)
Dept: URGENT CARE | Facility: URGENT CARE | Age: 50
End: 2025-05-06
Payer: COMMERCIAL

## 2025-05-06 VITALS
BODY MASS INDEX: 40.9 KG/M2 | HEART RATE: 78 BPM | DIASTOLIC BLOOD PRESSURE: 90 MMHG | SYSTOLIC BLOOD PRESSURE: 156 MMHG | WEIGHT: 302 LBS | RESPIRATION RATE: 20 BRPM | OXYGEN SATURATION: 98 % | HEIGHT: 72 IN | TEMPERATURE: 98 F

## 2025-05-06 DIAGNOSIS — H66.005 RECURRENT ACUTE SUPPURATIVE OTITIS MEDIA WITHOUT SPONTANEOUS RUPTURE OF LEFT TYMPANIC MEMBRANE: Primary | ICD-10-CM

## 2025-05-06 PROCEDURE — 3077F SYST BP >= 140 MM HG: CPT | Performed by: PHYSICIAN ASSISTANT

## 2025-05-06 PROCEDURE — 99214 OFFICE O/P EST MOD 30 MIN: CPT | Performed by: PHYSICIAN ASSISTANT

## 2025-05-06 PROCEDURE — 3080F DIAST BP >= 90 MM HG: CPT | Performed by: PHYSICIAN ASSISTANT

## 2025-05-06 NOTE — PROGRESS NOTES
[facility] [place]  [address] [address]  Phone: [phone]  Fax: [phone]    Patient:  Rm Ordoñez, Date of birth 1975  Date of Visit:  05/06/2025  Referring Provider No ref. provider found    Patient presents with:  Ear Problem: R ear pain  Pt was here X1 wk ago   Pt was diagnosed with a ear infection   Pt finish antibionts on Saturday          ICD-10-CM    1. Recurrent acute suppurative otitis media without spontaneous rupture of left tympanic membrane  H66.005 Adult ENT  Referral     Adult Audiology  Referral     amoxicillin-clavulanate (AUGMENTIN) 875-125 MG tablet          Patient Instructions   1) Take antibiotic as prescribed. Take this medication with food to avoid stomach upset.   2) Tylenol as needed for fever or pain.  3) Follow up with Audiology and ENT if symptoms are persisting.       Assessment & Plan      Assessment  - Persistent hearing loss and fullness in both ears following recent treatment for left ear infection with amoxicillin, possibly due to unresolved infection or other underlying issues.  - Erythema observed on the top portion of the left eardrum may be contributing to muffled or decreased hearing.  - Referral to audiology and ENT recommended to evaluate potential recurrent ear issues and hearing changes.    Plan  - Prescribe Augmentin, to be taken twice a day with food for the next 10 days, as a broader spectrum antibiotic to address the ear infection.  - Referral to audiology and ENT to evaluate potential recurrent issues with hearing and ear infections.  - Follow-up with audiology and ENT to monitor and manage ear health, especially given the history of ear infections.    Prescription  - Augmentin, twice a day with food for 10 days    Appointments  - Referral to audiology and ENT; a  will reach out within the next two business days to assist with scheduling.           History of Present  Illness     Pertinent history obtain from: patient    Rm Ordoñez, a 49-year-old male, was treated for a left ear infection with amoxicillin starting on April 26, 2025, and completed the antibiotic course on May 3, 2025. Despite treatment, he reports persistent hearing loss in the left ear and a full feeling in the right ear, with significantly reduced hearing in both ears. He experienced similar hearing changes during a previous episode of ear infection. He denied any current ear pain. He has a history of ear infections and strep throat during childhood.    Problem List:  2020-09: Morbid obesity (H)  2018-06: Pain in thoracic spine  2012-02: CARDIOVASCULAR SCREENING; LDL GOAL LESS THAN 160  Obstructive sleep apnea  Obesity (BMI 30-39.9)      Past Medical History:   Diagnosis Date    CARDIOVASCULAR SCREENING; LDL GOAL LESS THAN 160 2/3/2012    Obesity (BMI 30-39.9)     Obstructive sleep apnea        Social History     Tobacco Use    Smoking status: Never    Smokeless tobacco: Never   Substance Use Topics    Alcohol use: No       Physical Exam     Physical Exam  Vitals and nursing note reviewed.   Constitutional:       General: He is not in acute distress.     Appearance: He is not toxic-appearing or diaphoretic.   HENT:      Head: Normocephalic and atraumatic.      Right Ear: Ear canal and external ear normal. No middle ear effusion. Tympanic membrane is scarred (Mild). Tympanic membrane is not perforated, erythematous or bulging.      Left Ear: Ear canal and external ear normal.  No middle ear effusion. Tympanic membrane is erythematous (Affecting the upper half of the tympanic membrane). Tympanic membrane is not perforated or bulging.   Eyes:      Conjunctiva/sclera: Conjunctivae normal.   Pulmonary:      Effort: Pulmonary effort is normal.   Neurological:      Mental Status: He is alert.   Psychiatric:         Mood and Affect: Mood normal.         Behavior: Behavior normal.         Thought Content: Thought  "content normal.         Judgment: Judgment normal.         Vital signs:  BP (!) 156/90   Pulse 78   Temp 98  F (36.7  C)   Resp 20   Ht 1.829 m (6' 0.01\")   Wt (!) 137 kg (302 lb)   SpO2 98%   BMI 40.95 kg/m        Data   Laboratory data and imaging listed below were reviewed as part of this encounter.     No results found for any visits on 05/06/25.             Consent was obtained from the patient to use an AI documentation tool in the creation of  this note       "

## 2025-05-06 NOTE — PATIENT INSTRUCTIONS
1) Take antibiotic as prescribed. Take this medication with food to avoid stomach upset.   2) Tylenol as needed for fever or pain.  3) Follow up with Audiology and ENT if symptoms are persisting.

## 2025-05-06 NOTE — PROGRESS NOTES
Urgent Care Clinic Visit    Chief Complaint   Patient presents with    Ear Problem     R ear pain  Pt was here X1 wk ago   Pt was diagnosed with a ear infection   Pt finish antibionts on Saturday 5/6/2025     4:48 PM   Additional Questions   Roomed by ryan mac   Accompanied by SELF     Ryan Rose MA on 5/6/2025 at 4:49 PM         Urgent Care Clinic Visit    Chief Complaint   Patient presents with    Ear Problem     R ear pain  Pt was here X1 wk ago   Pt was diagnosed with a ear infection   Pt finish antibionts on Saturday 5/6/2025     4:48 PM   Additional Questions   Roomed by ryan mac   Accompanied by SELF

## 2025-05-07 ENCOUNTER — PATIENT OUTREACH (OUTPATIENT)
Dept: CARE COORDINATION | Facility: CLINIC | Age: 50
End: 2025-05-07
Payer: COMMERCIAL

## 2025-07-13 ENCOUNTER — HEALTH MAINTENANCE LETTER (OUTPATIENT)
Age: 50
End: 2025-07-13